# Patient Record
Sex: MALE | Race: WHITE | NOT HISPANIC OR LATINO | Employment: OTHER | URBAN - METROPOLITAN AREA
[De-identification: names, ages, dates, MRNs, and addresses within clinical notes are randomized per-mention and may not be internally consistent; named-entity substitution may affect disease eponyms.]

---

## 2018-01-13 ENCOUNTER — APPOINTMENT (OUTPATIENT)
Dept: GENERAL RADIOLOGY | Facility: HOSPITAL | Age: 63
End: 2018-01-13

## 2018-01-13 ENCOUNTER — APPOINTMENT (OUTPATIENT)
Dept: ULTRASOUND IMAGING | Facility: HOSPITAL | Age: 63
End: 2018-01-13

## 2018-01-13 ENCOUNTER — APPOINTMENT (OUTPATIENT)
Dept: CT IMAGING | Facility: HOSPITAL | Age: 63
End: 2018-01-13

## 2018-01-13 ENCOUNTER — APPOINTMENT (OUTPATIENT)
Dept: MRI IMAGING | Facility: HOSPITAL | Age: 63
End: 2018-01-13

## 2018-01-13 ENCOUNTER — HOSPITAL ENCOUNTER (INPATIENT)
Facility: HOSPITAL | Age: 63
LOS: 2 days | Discharge: HOME | End: 2018-01-15
Attending: EMERGENCY MEDICINE | Admitting: FAMILY MEDICINE

## 2018-01-13 DIAGNOSIS — K85.90 ACUTE PANCREATITIS, UNSPECIFIED COMPLICATION STATUS, UNSPECIFIED PANCREATITIS TYPE: Primary | ICD-10-CM

## 2018-01-13 DIAGNOSIS — K85.00 IDIOPATHIC ACUTE PANCREATITIS WITHOUT INFECTION OR NECROSIS: ICD-10-CM

## 2018-01-13 DIAGNOSIS — Q45.3 PANCREAS DIVISUM: ICD-10-CM

## 2018-01-13 DIAGNOSIS — Z76.89 REFERRED BY PRIMARY CARE PHYSICIAN: ICD-10-CM

## 2018-01-13 DIAGNOSIS — R10.9 ABDOMINAL PAIN, UNSPECIFIED ABDOMINAL LOCATION: ICD-10-CM

## 2018-01-13 DIAGNOSIS — D72.829 LEUKOCYTOSIS, UNSPECIFIED TYPE: ICD-10-CM

## 2018-01-13 LAB
ALBUMIN SERPL-MCNC: 4.6 G/DL (ref 3.2–4.8)
ALBUMIN/GLOB SERPL: 1.4 G/DL (ref 1.5–2.5)
ALP SERPL-CCNC: 62 U/L (ref 25–100)
ALT SERPL W P-5'-P-CCNC: 39 U/L (ref 7–40)
ANION GAP SERPL CALCULATED.3IONS-SCNC: 13 MMOL/L (ref 3–11)
AST SERPL-CCNC: 28 U/L (ref 0–33)
BASOPHILS # BLD AUTO: 0.01 10*3/MM3 (ref 0–0.2)
BASOPHILS NFR BLD AUTO: 0.1 % (ref 0–1)
BILIRUB SERPL-MCNC: 0.5 MG/DL (ref 0.3–1.2)
BILIRUB UR QL STRIP: NEGATIVE
BUN BLD-MCNC: 23 MG/DL (ref 9–23)
BUN/CREAT SERPL: 23 (ref 7–25)
CALCIUM SPEC-SCNC: 9.6 MG/DL (ref 8.7–10.4)
CHLORIDE SERPL-SCNC: 102 MMOL/L (ref 99–109)
CLARITY UR: CLEAR
CO2 SERPL-SCNC: 20 MMOL/L (ref 20–31)
COLOR UR: YELLOW
CREAT BLD-MCNC: 1 MG/DL (ref 0.6–1.3)
DEPRECATED RDW RBC AUTO: 44.4 FL (ref 37–54)
EOSINOPHIL # BLD AUTO: 0.03 10*3/MM3 (ref 0–0.3)
EOSINOPHIL NFR BLD AUTO: 0.2 % (ref 0–3)
ERYTHROCYTE [DISTWIDTH] IN BLOOD BY AUTOMATED COUNT: 14.2 % (ref 11.3–14.5)
GFR SERPL CREATININE-BSD FRML MDRD: 75 ML/MIN/1.73
GLOBULIN UR ELPH-MCNC: 3.2 GM/DL
GLUCOSE BLD-MCNC: 145 MG/DL (ref 70–100)
GLUCOSE UR STRIP-MCNC: NEGATIVE MG/DL
HCT VFR BLD AUTO: 45.1 % (ref 38.9–50.9)
HGB BLD-MCNC: 15.7 G/DL (ref 13.1–17.5)
HGB UR QL STRIP.AUTO: NEGATIVE
HOLD SPECIMEN: NORMAL
HOLD SPECIMEN: NORMAL
IMM GRANULOCYTES # BLD: 0.03 10*3/MM3 (ref 0–0.03)
IMM GRANULOCYTES NFR BLD: 0.2 % (ref 0–0.6)
KETONES UR QL STRIP: ABNORMAL
LEUKOCYTE ESTERASE UR QL STRIP.AUTO: NEGATIVE
LIPASE SERPL-CCNC: 565 U/L (ref 6–51)
LYMPHOCYTES # BLD AUTO: 0.76 10*3/MM3 (ref 0.6–4.8)
LYMPHOCYTES NFR BLD AUTO: 6.3 % (ref 24–44)
MCH RBC QN AUTO: 29.9 PG (ref 27–31)
MCHC RBC AUTO-ENTMCNC: 34.8 G/DL (ref 32–36)
MCV RBC AUTO: 85.9 FL (ref 80–99)
MONOCYTES # BLD AUTO: 0.56 10*3/MM3 (ref 0–1)
MONOCYTES NFR BLD AUTO: 4.6 % (ref 0–12)
NEUTROPHILS # BLD AUTO: 10.67 10*3/MM3 (ref 1.5–8.3)
NEUTROPHILS NFR BLD AUTO: 88.6 % (ref 41–71)
NITRITE UR QL STRIP: NEGATIVE
PH UR STRIP.AUTO: 6 [PH] (ref 5–8)
PLATELET # BLD AUTO: 204 10*3/MM3 (ref 150–450)
PMV BLD AUTO: 12 FL (ref 6–12)
POTASSIUM BLD-SCNC: 3.8 MMOL/L (ref 3.5–5.5)
PROT SERPL-MCNC: 7.8 G/DL (ref 5.7–8.2)
PROT UR QL STRIP: ABNORMAL
RBC # BLD AUTO: 5.25 10*6/MM3 (ref 4.2–5.76)
SODIUM BLD-SCNC: 135 MMOL/L (ref 132–146)
SP GR UR STRIP: 1.03 (ref 1–1.03)
TRIGL SERPL-MCNC: 54 MG/DL (ref 0–150)
TROPONIN I SERPL-MCNC: <0.006 NG/ML
UROBILINOGEN UR QL STRIP: ABNORMAL
WBC NRBC COR # BLD: 12.06 10*3/MM3 (ref 3.5–10.8)
WHOLE BLOOD HOLD SPECIMEN: NORMAL
WHOLE BLOOD HOLD SPECIMEN: NORMAL

## 2018-01-13 PROCEDURE — 25010000002 FENTANYL CITRATE (PF) 100 MCG/2ML SOLUTION: Performed by: EMERGENCY MEDICINE

## 2018-01-13 PROCEDURE — 74181 MRI ABDOMEN W/O CONTRAST: CPT

## 2018-01-13 PROCEDURE — 25010000002 KETOROLAC TROMETHAMINE PER 15 MG: Performed by: EMERGENCY MEDICINE

## 2018-01-13 PROCEDURE — 76705 ECHO EXAM OF ABDOMEN: CPT

## 2018-01-13 PROCEDURE — 81003 URINALYSIS AUTO W/O SCOPE: CPT | Performed by: EMERGENCY MEDICINE

## 2018-01-13 PROCEDURE — 0 IOPAMIDOL 61 % SOLUTION: Performed by: EMERGENCY MEDICINE

## 2018-01-13 PROCEDURE — 93005 ELECTROCARDIOGRAM TRACING: CPT | Performed by: EMERGENCY MEDICINE

## 2018-01-13 PROCEDURE — 99253 IP/OBS CNSLTJ NEW/EST LOW 45: CPT | Performed by: INTERNAL MEDICINE

## 2018-01-13 PROCEDURE — 84478 ASSAY OF TRIGLYCERIDES: CPT | Performed by: INTERNAL MEDICINE

## 2018-01-13 PROCEDURE — 99223 1ST HOSP IP/OBS HIGH 75: CPT | Performed by: FAMILY MEDICINE

## 2018-01-13 PROCEDURE — 85025 COMPLETE CBC W/AUTO DIFF WBC: CPT | Performed by: EMERGENCY MEDICINE

## 2018-01-13 PROCEDURE — 83690 ASSAY OF LIPASE: CPT | Performed by: EMERGENCY MEDICINE

## 2018-01-13 PROCEDURE — 99285 EMERGENCY DEPT VISIT HI MDM: CPT

## 2018-01-13 PROCEDURE — 71045 X-RAY EXAM CHEST 1 VIEW: CPT

## 2018-01-13 PROCEDURE — 84484 ASSAY OF TROPONIN QUANT: CPT | Performed by: EMERGENCY MEDICINE

## 2018-01-13 PROCEDURE — 80053 COMPREHEN METABOLIC PANEL: CPT | Performed by: EMERGENCY MEDICINE

## 2018-01-13 PROCEDURE — 25010000002 ONDANSETRON PER 1 MG: Performed by: EMERGENCY MEDICINE

## 2018-01-13 PROCEDURE — 74177 CT ABD & PELVIS W/CONTRAST: CPT

## 2018-01-13 RX ORDER — CHOLECALCIFEROL (VITAMIN D3) 50 MCG
2000 TABLET ORAL DAILY
COMMUNITY

## 2018-01-13 RX ORDER — ALUMINA, MAGNESIA, AND SIMETHICONE 2400; 2400; 240 MG/30ML; MG/30ML; MG/30ML
15 SUSPENSION ORAL ONCE
Status: COMPLETED | OUTPATIENT
Start: 2018-01-13 | End: 2018-01-13

## 2018-01-13 RX ORDER — NALOXONE HCL 0.4 MG/ML
0.4 VIAL (ML) INJECTION
Status: DISCONTINUED | OUTPATIENT
Start: 2018-01-13 | End: 2018-01-15 | Stop reason: HOSPADM

## 2018-01-13 RX ORDER — KETOROLAC TROMETHAMINE 15 MG/ML
15 INJECTION, SOLUTION INTRAMUSCULAR; INTRAVENOUS ONCE
Status: COMPLETED | OUTPATIENT
Start: 2018-01-13 | End: 2018-01-13

## 2018-01-13 RX ORDER — RAMIPRIL 5 MG/1
10 CAPSULE ORAL DAILY
Status: DISCONTINUED | OUTPATIENT
Start: 2018-01-13 | End: 2018-01-15 | Stop reason: HOSPADM

## 2018-01-13 RX ORDER — HYDROMORPHONE HYDROCHLORIDE 1 MG/ML
0.5 INJECTION, SOLUTION INTRAMUSCULAR; INTRAVENOUS; SUBCUTANEOUS
Status: DISCONTINUED | OUTPATIENT
Start: 2018-01-13 | End: 2018-01-15 | Stop reason: HOSPADM

## 2018-01-13 RX ORDER — DOCUSATE SODIUM 100 MG/1
100 CAPSULE, LIQUID FILLED ORAL 2 TIMES DAILY
COMMUNITY
End: 2018-01-15 | Stop reason: HOSPADM

## 2018-01-13 RX ORDER — PRAVASTATIN SODIUM 40 MG
40 TABLET ORAL DAILY
COMMUNITY

## 2018-01-13 RX ORDER — ONDANSETRON 2 MG/ML
4 INJECTION INTRAMUSCULAR; INTRAVENOUS EVERY 6 HOURS PRN
Status: DISCONTINUED | OUTPATIENT
Start: 2018-01-13 | End: 2018-01-15 | Stop reason: HOSPADM

## 2018-01-13 RX ORDER — ASPIRIN 81 MG/1
81 TABLET ORAL DAILY
COMMUNITY

## 2018-01-13 RX ORDER — FENTANYL CITRATE 50 UG/ML
50 INJECTION, SOLUTION INTRAMUSCULAR; INTRAVENOUS ONCE
Status: COMPLETED | OUTPATIENT
Start: 2018-01-13 | End: 2018-01-13

## 2018-01-13 RX ORDER — SODIUM CHLORIDE 0.9 % (FLUSH) 0.9 %
1-10 SYRINGE (ML) INJECTION AS NEEDED
Status: DISCONTINUED | OUTPATIENT
Start: 2018-01-13 | End: 2018-01-15 | Stop reason: HOSPADM

## 2018-01-13 RX ORDER — ONDANSETRON 2 MG/ML
4 INJECTION INTRAMUSCULAR; INTRAVENOUS ONCE
Status: COMPLETED | OUTPATIENT
Start: 2018-01-13 | End: 2018-01-13

## 2018-01-13 RX ORDER — SODIUM CHLORIDE 0.9 % (FLUSH) 0.9 %
10 SYRINGE (ML) INJECTION AS NEEDED
Status: DISCONTINUED | OUTPATIENT
Start: 2018-01-13 | End: 2018-01-15 | Stop reason: HOSPADM

## 2018-01-13 RX ORDER — HYDROCODONE BITARTRATE AND ACETAMINOPHEN 5; 325 MG/1; MG/1
1 TABLET ORAL EVERY 4 HOURS PRN
Status: DISCONTINUED | OUTPATIENT
Start: 2018-01-13 | End: 2018-01-15 | Stop reason: HOSPADM

## 2018-01-13 RX ORDER — HYDROCHLOROTHIAZIDE 12.5 MG/1
12.5 TABLET ORAL DAILY
Status: DISCONTINUED | OUTPATIENT
Start: 2018-01-13 | End: 2018-01-15 | Stop reason: HOSPADM

## 2018-01-13 RX ORDER — ATORVASTATIN CALCIUM 10 MG/1
10 TABLET, FILM COATED ORAL NIGHTLY
Status: DISCONTINUED | OUTPATIENT
Start: 2018-01-13 | End: 2018-01-15 | Stop reason: HOSPADM

## 2018-01-13 RX ORDER — RAMIPRIL 10 MG/1
10 CAPSULE ORAL DAILY
COMMUNITY

## 2018-01-13 RX ORDER — LORAZEPAM 1 MG/1
1 TABLET ORAL EVERY 6 HOURS PRN
Status: DISCONTINUED | OUTPATIENT
Start: 2018-01-13 | End: 2018-01-15 | Stop reason: HOSPADM

## 2018-01-13 RX ORDER — HYDROCHLOROTHIAZIDE 12.5 MG/1
12.5 TABLET ORAL DAILY
COMMUNITY

## 2018-01-13 RX ORDER — SODIUM CHLORIDE 9 MG/ML
100 INJECTION, SOLUTION INTRAVENOUS CONTINUOUS
Status: DISCONTINUED | OUTPATIENT
Start: 2018-01-13 | End: 2018-01-15 | Stop reason: HOSPADM

## 2018-01-13 RX ADMIN — RAMIPRIL 10 MG: 5 CAPSULE ORAL at 15:47

## 2018-01-13 RX ADMIN — SODIUM CHLORIDE 1000 ML: 9 INJECTION, SOLUTION INTRAVENOUS at 08:00

## 2018-01-13 RX ADMIN — IOPAMIDOL 85 ML: 612 INJECTION, SOLUTION INTRAVENOUS at 10:36

## 2018-01-13 RX ADMIN — SODIUM CHLORIDE 100 ML/HR: 9 INJECTION, SOLUTION INTRAVENOUS at 14:57

## 2018-01-13 RX ADMIN — ONDANSETRON 4 MG: 2 INJECTION INTRAMUSCULAR; INTRAVENOUS at 08:02

## 2018-01-13 RX ADMIN — HYDROCODONE BITARTRATE AND ACETAMINOPHEN 1 TABLET: 5; 325 TABLET ORAL at 18:41

## 2018-01-13 RX ADMIN — HYDROCODONE BITARTRATE AND ACETAMINOPHEN 1 TABLET: 5; 325 TABLET ORAL at 22:39

## 2018-01-13 RX ADMIN — HYDROCHLOROTHIAZIDE 12.5 MG: 12.5 TABLET ORAL at 14:37

## 2018-01-13 RX ADMIN — FENTANYL CITRATE 50 MCG: 50 INJECTION INTRAMUSCULAR; INTRAVENOUS at 08:03

## 2018-01-13 RX ADMIN — HYDROCODONE BITARTRATE AND ACETAMINOPHEN 1 TABLET: 5; 325 TABLET ORAL at 14:41

## 2018-01-13 RX ADMIN — FENTANYL CITRATE 50 MCG: 50 INJECTION INTRAMUSCULAR; INTRAVENOUS at 12:53

## 2018-01-13 RX ADMIN — ATORVASTATIN CALCIUM 10 MG: 10 TABLET, FILM COATED ORAL at 20:50

## 2018-01-13 RX ADMIN — ALUMINUM HYDROXIDE, MAGNESIUM HYDROXIDE, AND DIMETHICONE 15 ML: 400; 400; 40 SUSPENSION ORAL at 07:59

## 2018-01-13 RX ADMIN — KETOROLAC TROMETHAMINE 15 MG: 15 INJECTION, SOLUTION INTRAMUSCULAR; INTRAVENOUS at 09:24

## 2018-01-13 RX ADMIN — LIDOCAINE HYDROCHLORIDE 15 ML: 20 SOLUTION ORAL; TOPICAL at 07:58

## 2018-01-13 NOTE — H&P
[facility] Medicine Services  HISTORY AND PHYSICAL    Primary Care Physician: Yuri Saul MD    Subjective     Chief Complaint:  Abdominal pain    History of Present Illness: This is a pleasant 62 year old  male accompanied by his wife Caro of 39 years to [facility] ED secondary to abdominal pain.  He describes less than 24 hours of constant, mid abdominal pain not resolving with home care.  He rates the non-radiating pain as severe and reports its as a 7 on a 1 to 10 pain scale.  He describes a decreased appetite.  He can not identify any modifying activities.  He reports associated nausea but no emesis, hematemesis, melena or hematochezia.  His last bowel movement was this morning described as normal.  He denies diarrhea, chalky stools or prior history of similar pain.  He describes occasional indigestion that resolves with over the counter antacid.  He denies dysphagia, odynophagia or previous upper endoscopies.  He denies fever, chills or jaundice.  He drinks 2-3 beers a week.  He denies excessive alcohol consumption.  In the ED his lipase was 565 and a CT identified pancreatic inflammation.    Review of Systems   Constitutional: Negative.  Negative for chills and fever.   HENT: Negative.  Negative for nosebleeds and trouble swallowing.    Eyes: Negative.  Negative for visual disturbance.   Respiratory: Negative.    Cardiovascular: Negative.  Negative for chest pain, palpitations and leg swelling.   Gastrointestinal: Positive for abdominal distention, abdominal pain and nausea. Negative for anal bleeding, blood in stool, constipation, diarrhea and vomiting.   Endocrine: Negative.  Negative for polydipsia, polyphagia and polyuria.   Genitourinary: Negative.  Negative for difficulty urinating.   Musculoskeletal: Negative.  Negative for myalgias.   Skin: Negative.  Negative for pallor and rash.   Allergic/Immunologic: Negative.    Neurological: Negative.  Negative for  "dizziness.   Hematological: Negative.  Does not bruise/bleed easily.   Psychiatric/Behavioral: Negative.    All other systems reviewed and are negative.     Past Medical History:   Diagnosis Date   • Hyperlipidemia    • Hypertension    • Left ACL tear        Past Surgical History:   Procedure Laterality Date   • COLONOSCOPY  2014   • VASECTOMY  1987       Family History   Problem Relation Age of Onset   • Alzheimer's disease Mother    • Heart disease Father      Social History     Social History   • Marital status:      Spouse name: Caro   • Number of children: 2   • Years of education: College     Social History Main Topics   • Smoking status: Never Smoker   • Smokeless tobacco: Never Used   • Alcohol use Yes   • Drug use: No   • Sexual activity: Yes     Partners: Female     Medications:  Medication Sig   • aspirin 81 MG EC tablet Take 81 mg by mouth Daily.   • Cholecalciferol (VITAMIN D) 2000 units tablet Take 2,000 Units by mouth Daily.   • docusate sodium (COLACE) 100 MG capsule Take 100 mg by mouth 2 (Two) Times a Day.   • hydrochlorothiazide (HYDRODIURIL) 12.5 MG tablet Take 12.5 mg by mouth Daily.   • pravastatin (PRAVACHOL) 40 MG tablet Take 40 mg by mouth Daily.   • ramipril (ALTACE) 10 MG capsule Take 10 mg by mouth Daily.       Allergies:  Allergies   Allergen Reactions   • Penicillins Hives       Objective     Vital Signs: /93 (BP Location: Left arm, Patient Position: Lying)  Pulse 51  Temp 98 °F (36.7 °C) (Oral)   Resp 20  Ht 170.2 cm (67\")  Wt 77.2 kg (170 lb 4.8 oz)  SpO2 98%  BMI 26.67 kg/m2  Body mass index is 26.67 kg/(m^2).    Physical Exam   Constitutional: He is oriented to person, place, and time. He appears well-developed and well-nourished. He is cooperative.   HENT:   Head: Normocephalic and atraumatic.   Right Ear: Hearing and external ear normal.   Left Ear: Hearing and external ear normal.   Nose: Nose normal.   Mouth/Throat: Uvula is midline, oropharynx is clear and " moist and mucous membranes are normal.   Eyes: Conjunctivae and EOM are normal. Pupils are equal, round, and reactive to light. No scleral icterus.   Neck: Trachea normal and normal range of motion. Neck supple. No JVD present. Carotid bruit is not present. No thyromegaly present.   Cardiovascular: Normal rate, regular rhythm, normal heart sounds and intact distal pulses.    Pulmonary/Chest: Effort normal and breath sounds normal.   Abdominal: Soft. Bowel sounds are normal. There is no hepatosplenomegaly. There is tenderness in the right upper quadrant and periumbilical area. There is no rebound and no guarding.   Musculoskeletal: Normal range of motion.   Lymphadenopathy:     He has no cervical adenopathy.   Neurological: He is alert and oriented to person, place, and time. He has normal strength and normal reflexes. No sensory deficit.   Skin: Skin is warm and dry.   Psychiatric: He has a normal mood and affect. His speech is normal and behavior is normal. Judgment and thought content normal. Cognition and memory are normal.   Nursing note and vitals reviewed.    Results Reviewed:     Results from last 7 days  Lab Units 01/13/18  0738   WBC 10*3/mm3 12.06*   HEMOGLOBIN g/dL 15.7   PLATELETS 10*3/mm3 204       Results from last 7 days  Lab Units 01/13/18  0738   SODIUM mmol/L 135   POTASSIUM mmol/L 3.8   CO2 mmol/L 20.0   CREATININE mg/dL 1.00   GLUCOSE mg/dL 145*   CALCIUM mg/dL 9.6     I have personally reviewed and interpreted available lab data dated 01/13/18.  I have personally reviewed CXR and CT abdomen imaging reports available and dated 01/13/18.  I have personally reviewed ECG report dated 01/13/18.   I have personally discussed the case with the ED physician.  I have personally reviewed and summarized old records.    Assessment / Plan     Assessment/Problem List:   Active Problems:    Acute pancreatitis    Hyperlipidemia    Hypertension    Plan:  We will admit as inpatient on telemetry.  I believe this  patient meets INPATIENT status due to the need for care which can only be reasonably provided in an hospital setting such as aggressive/expedited ancillary services and/or consultation services and the necessity for IV medications, close physician monitoring and/or the possible need for procedures.  In such, I feel patient’s risk for adverse outcomes and need for care warrant INPATIENT evaluation and predict the patient’s care encounter to likely last beyond 2 midnights.  We will consult gastroenterology.  We will continue with IV fluids and maintain NPO.  An ultrasound has been ordered of his gallbladder.  We will continue with parentally administered controlled substances to assist with his pain.  We will add anti-emetics as well.  We will follow his lipase level.  Morning labs have been ordered including a lipid analysis (triglycerides).  We will continue to monitor his blood pressure and continue with his blood pressure and statin medications.  The plan has been discussed with the patient and his wife who is at the bedside.      DVT prophylaxis: bhavani portillo  Code Status: Full  Admission Status: Patient will be admitted to [facility].     Nguyễn Hager MD 01/13/18 5:19 PM

## 2018-01-13 NOTE — PLAN OF CARE
Problem: Pain, Acute (Adult)  Goal: Identify Related Risk Factors and Signs and Symptoms  Outcome: Ongoing (interventions implemented as appropriate)   01/13/18 1759   Pain, Acute   Related Risk Factors (Acute Pain) fear   Signs and Symptoms (Acute Pain) verbalization of pain descriptors     Goal: Acceptable Pain Control/Comfort Level  Outcome: Ongoing (interventions implemented as appropriate)   01/13/18 1759   Pain, Acute (Adult)   Acceptable Pain Control/Comfort Level making progress toward outcome, pain acceptable range less then 4 on a pain scale of 0 to 10.   Pt states adequate control with pain pill.

## 2018-01-13 NOTE — ED PROVIDER NOTES
Subjective   Patient is a 62 y.o. male presenting with abdominal pain.   Abdominal Pain   Pain location:  Periumbilical  Pain quality: aching, bloating and burning    Pain radiates to:  Does not radiate  Pain severity:  Moderate  Onset quality:  Gradual  Duration:  1 day  Timing:  Constant  Progression:  Waxing and waning  Chronicity:  New  Context: eating    Relieved by:  Nothing  Worsened by:  Eating  Ineffective treatments:  Cold, heat, liquids, eating and antacids  Associated symptoms: nausea and vomiting    Associated symptoms: no chest pain, no constipation and no shortness of breath        Review of Systems   Respiratory: Negative for shortness of breath.    Cardiovascular: Negative for chest pain.   Gastrointestinal: Positive for abdominal pain, nausea and vomiting. Negative for constipation.   All other systems reviewed and are negative.      Past Medical History:   Diagnosis Date   • Hyperlipidemia    • Hypertension        Allergies   Allergen Reactions   • Penicillins Hives       History reviewed. No pertinent surgical history.    History reviewed. No pertinent family history.    Social History     Social History   • Marital status:      Spouse name: N/A   • Number of children: N/A   • Years of education: N/A     Social History Main Topics   • Smoking status: Never Smoker   • Smokeless tobacco: None   • Alcohol use Yes      Comment: socially   • Drug use: No   • Sexual activity: Defer     Other Topics Concern   • None     Social History Narrative   • None           Objective   Physical Exam   Constitutional: He is oriented to person, place, and time. He appears well-developed and well-nourished.   HENT:   Head: Normocephalic and atraumatic.   Right Ear: External ear normal.   Left Ear: External ear normal.   Nose: Nose normal.   Mouth/Throat: Oropharynx is clear and moist.   Eyes: Conjunctivae and EOM are normal. Pupils are equal, round, and reactive to light.   Neck: Normal range of motion. Neck  supple.   Cardiovascular: Normal rate, regular rhythm, normal heart sounds and intact distal pulses.    Pulmonary/Chest: Effort normal and breath sounds normal.   Abdominal: Soft. Bowel sounds are normal. There is tenderness.   Musculoskeletal: Normal range of motion.   Neurological: He is alert and oriented to person, place, and time.   Skin: Skin is warm and dry.   Psychiatric: He has a normal mood and affect. His behavior is normal. Judgment normal.       Procedures         ED Course  ED Course   Value Comment By Time   Troponin I: <0.006 (Reviewed) Albin Barnhart MD 01/13 0842   Lipase: (!) 565 (Reviewed) Albin Barnhart MD 01/13 1113    I spoke with Dr. Hager and he will admit TAMMY Mccollum 01/13 1216                  MDM    Final diagnoses:   Acute pancreatitis, unspecified complication status, unspecified pancreatitis type   Abdominal pain, unspecified abdominal location   Leukocytosis, unspecified type   Referred by primary care physician            TAMMY Mccollum  01/13/18 1218

## 2018-01-13 NOTE — CONSULTS
[facility] Consult    Referring Provider: Nguyễn Hager MD    PCP: Yuri Saul MD    Reason for Consultation: Acute pancreatitis    Chief complaint: Abdominal pain    History of present illness:    Nimesh Muñoz is a 62 y.o. male who is admitted with 1-day history of acute non-radiating abdominal pain in mid abdomen. Pain constant burning sensation. There is associated nausea, non-bloody emesis, and bloating. Not improved with antacids. Evaluation in ER revealed lipase 565. CT with contrast shows inflammation at tail of pancreas.    Allergies:  Penicillins    Scheduled Meds:    atorvastatin 10 mg Oral Nightly   hydrochlorothiazide 12.5 mg Oral Daily   ramipril 10 mg Oral Daily        Infusions:    sodium chloride 100 mL/hr       PRN Meds:  HYDROcodone-acetaminophen  •  HYDROmorphone **AND** naloxone  •  LORazepam  •  ondansetron  •  sodium chloride  •  sodium chloride    Home Meds:  Prescriptions Prior to Admission   Medication Sig Dispense Refill Last Dose   • hydrochlorothiazide (HYDRODIURIL) 12.5 MG tablet Take 12.5 mg by mouth Daily.      • pravastatin (PRAVACHOL) 40 MG tablet Take 40 mg by mouth Daily.      • ramipril (ALTACE) 10 MG capsule Take 10 mg by mouth Daily.          ROS: Review of Systems   Constitutional: Positive for appetite change. Negative for activity change, chills, fever and unexpected weight change.   HENT: Negative for congestion, mouth sores and trouble swallowing.    Eyes: Negative.    Respiratory: Negative for cough, choking, chest tightness, shortness of breath and wheezing.    Cardiovascular: Negative for chest pain, palpitations and leg swelling.        Patient has normal heart rate in low 50's. He exercises regularly.   Gastrointestinal: Positive for abdominal distention, abdominal pain, nausea and vomiting.   Endocrine: Negative.    Genitourinary: Negative.    Musculoskeletal: Negative for arthralgias, back pain and joint swelling.        Had left shoulder pain  "last week.   Skin: Negative for color change, pallor and rash.   Allergic/Immunologic: Negative.    Neurological: Negative for dizziness, tremors, seizures, syncope, weakness and light-headedness.   Hematological: Negative for adenopathy. Does not bruise/bleed easily.   Psychiatric/Behavioral: Negative for agitation, behavioral problems and dysphoric mood. The patient is not nervous/anxious.        PAST MED HX:  Past Medical History:   Diagnosis Date   • Hyperlipidemia    • Hypertension    Adenomatous colon polyps.    PAST SURG HX:  Colonoscopy 3/2016 with Dr. Vinod WEN HX:  History reviewed. No pertinent family history.    SOC HX:  Social History     Social History   • Marital status:      Spouse name: Caro   • Number of children: N/A   • Years of education: N/A     Occupational History   • Not on file.     Social History Main Topics   • Smoking status: Never Smoker   • Smokeless tobacco: Never Used   • Alcohol use Yes   • Drug use: No   • Sexual activity: Yes     Partners: Female     Other Topics Concern   • Not on file     Social History Narrative   • Exercises regularly.       PHYSICAL EXAM  /88  Pulse 50  Temp 98.2 °F (36.8 °C)  Resp 16  Ht 170.2 cm (67\")  Wt 74.8 kg (165 lb)  SpO2 93%  BMI 25.84 kg/m2  Wt Readings from Last 3 Encounters:   01/13/18 74.8 kg (165 lb)   ,body mass index is 25.84 kg/(m^2).  Physical Exam   Constitutional: He is oriented to person, place, and time. He appears well-developed and well-nourished. No distress.   HENT:   Head: Normocephalic.   Eyes: Conjunctivae are normal. No scleral icterus.   Neck: Normal range of motion.   Cardiovascular: Normal rate.    Mild bradycardia.   Pulmonary/Chest: Effort normal and breath sounds normal. No respiratory distress. He has no wheezes.   Abdominal: Soft. Bowel sounds are normal. He exhibits no distension and no mass. There is no tenderness.   Genitourinary:   Genitourinary Comments: Deferred   Musculoskeletal: Normal range " of motion. He exhibits no edema.   Neurological: He is alert and oriented to person, place, and time.   Skin: Skin is warm and dry. No rash noted.   Psychiatric: He has a normal mood and affect. His behavior is normal. Judgment and thought content normal.   Nursing note and vitals reviewed.        Results Review:   I reviewed the patient's new clinical results.    Lab Results   Component Value Date    WBC 12.06 (H) 01/13/2018    HGB 15.7 01/13/2018    HCT 45.1 01/13/2018    MCV 85.9 01/13/2018     01/13/2018       No results found for: INR    Lab Results   Component Value Date    GLUCOSE 145 (H) 01/13/2018    BUN 23 01/13/2018    CREATININE 1.00 01/13/2018    EGFRIFNONA 75 01/13/2018    BCR 23.0 01/13/2018    CO2 20.0 01/13/2018    CALCIUM 9.6 01/13/2018    ALBUMIN 4.60 01/13/2018    AST 28 01/13/2018    ALT 39 01/13/2018         ASSESSMENTS/PLANS    Mild acute interstitial pancreatitis. CT films reviewed and agree with mild inflammation at tail.  Nausea and vomiting, improved.  Epigastric pain, controlled with opiates.  Mild bradycardia due to conditioning (regular exercise).    Discussion: Patient rarely drinks alcohol, and not recently. Has no history of recent abdominal trauma. Takes no OTC medications or herbal remedies. He states that his triglycerides have been normal in the past. Gallstone pancreatitis is most likely. Side effect of HCTZ or ramipril is less likely.     >> Await GB US  >> If GB US is negative, obtain MRCP.  >> Triglycerides added to lab sample.    I discussed the patients findings and my recommendations with patient and wife.    Mark I. Brunner, MD  01/13/18  2:34 PM

## 2018-01-14 PROBLEM — Q45.3 PANCREAS DIVISUM: Status: ACTIVE | Noted: 2018-01-14

## 2018-01-14 LAB
ALBUMIN SERPL-MCNC: 3.9 G/DL (ref 3.2–4.8)
ALBUMIN/GLOB SERPL: 1.6 G/DL (ref 1.5–2.5)
ALP SERPL-CCNC: 57 U/L (ref 25–100)
ALT SERPL W P-5'-P-CCNC: 29 U/L (ref 7–40)
ANION GAP SERPL CALCULATED.3IONS-SCNC: 9 MMOL/L (ref 3–11)
ARTICHOKE IGE QN: 60 MG/DL (ref 0–130)
AST SERPL-CCNC: 20 U/L (ref 0–33)
BASOPHILS # BLD AUTO: 0.02 10*3/MM3 (ref 0–0.2)
BASOPHILS NFR BLD AUTO: 0.2 % (ref 0–1)
BILIRUB SERPL-MCNC: 0.9 MG/DL (ref 0.3–1.2)
BUN BLD-MCNC: 16 MG/DL (ref 9–23)
BUN/CREAT SERPL: 20 (ref 7–25)
CALCIUM SPEC-SCNC: 8.6 MG/DL (ref 8.7–10.4)
CHLORIDE SERPL-SCNC: 103 MMOL/L (ref 99–109)
CHOLEST SERPL-MCNC: 125 MG/DL (ref 0–200)
CO2 SERPL-SCNC: 23 MMOL/L (ref 20–31)
CREAT BLD-MCNC: 0.8 MG/DL (ref 0.6–1.3)
DEPRECATED RDW RBC AUTO: 45.1 FL (ref 37–54)
EOSINOPHIL # BLD AUTO: 0.11 10*3/MM3 (ref 0–0.3)
EOSINOPHIL NFR BLD AUTO: 1.1 % (ref 0–3)
ERYTHROCYTE [DISTWIDTH] IN BLOOD BY AUTOMATED COUNT: 14.1 % (ref 11.3–14.5)
GFR SERPL CREATININE-BSD FRML MDRD: 98 ML/MIN/1.73
GLOBULIN UR ELPH-MCNC: 2.5 GM/DL
GLUCOSE BLD-MCNC: 90 MG/DL (ref 70–100)
HBA1C MFR BLD: 5.7 % (ref 4.8–5.6)
HCT VFR BLD AUTO: 41.5 % (ref 38.9–50.9)
HDLC SERPL-MCNC: 56 MG/DL (ref 40–60)
HGB BLD-MCNC: 14 G/DL (ref 13.1–17.5)
IMM GRANULOCYTES # BLD: 0.03 10*3/MM3 (ref 0–0.03)
IMM GRANULOCYTES NFR BLD: 0.3 % (ref 0–0.6)
LIPASE SERPL-CCNC: 349 U/L (ref 6–51)
LYMPHOCYTES # BLD AUTO: 1.2 10*3/MM3 (ref 0.6–4.8)
LYMPHOCYTES NFR BLD AUTO: 11.9 % (ref 24–44)
MCH RBC QN AUTO: 29.4 PG (ref 27–31)
MCHC RBC AUTO-ENTMCNC: 33.7 G/DL (ref 32–36)
MCV RBC AUTO: 87.2 FL (ref 80–99)
MONOCYTES # BLD AUTO: 0.91 10*3/MM3 (ref 0–1)
MONOCYTES NFR BLD AUTO: 9 % (ref 0–12)
NEUTROPHILS # BLD AUTO: 7.83 10*3/MM3 (ref 1.5–8.3)
NEUTROPHILS NFR BLD AUTO: 77.5 % (ref 41–71)
PLATELET # BLD AUTO: 171 10*3/MM3 (ref 150–450)
PMV BLD AUTO: 11.7 FL (ref 6–12)
POTASSIUM BLD-SCNC: 4.1 MMOL/L (ref 3.5–5.5)
PROT SERPL-MCNC: 6.4 G/DL (ref 5.7–8.2)
RBC # BLD AUTO: 4.76 10*6/MM3 (ref 4.2–5.76)
SODIUM BLD-SCNC: 135 MMOL/L (ref 132–146)
TRIGL SERPL-MCNC: 59 MG/DL (ref 0–150)
TSH SERPL DL<=0.05 MIU/L-ACNC: 2.77 MIU/ML (ref 0.35–5.35)
WBC NRBC COR # BLD: 10.1 10*3/MM3 (ref 3.5–10.8)

## 2018-01-14 PROCEDURE — 99232 SBSQ HOSP IP/OBS MODERATE 35: CPT | Performed by: INTERNAL MEDICINE

## 2018-01-14 PROCEDURE — 99232 SBSQ HOSP IP/OBS MODERATE 35: CPT | Performed by: FAMILY MEDICINE

## 2018-01-14 PROCEDURE — 80053 COMPREHEN METABOLIC PANEL: CPT | Performed by: FAMILY MEDICINE

## 2018-01-14 PROCEDURE — 83036 HEMOGLOBIN GLYCOSYLATED A1C: CPT | Performed by: FAMILY MEDICINE

## 2018-01-14 PROCEDURE — 85025 COMPLETE CBC W/AUTO DIFF WBC: CPT | Performed by: FAMILY MEDICINE

## 2018-01-14 PROCEDURE — 80061 LIPID PANEL: CPT | Performed by: FAMILY MEDICINE

## 2018-01-14 PROCEDURE — 84443 ASSAY THYROID STIM HORMONE: CPT | Performed by: FAMILY MEDICINE

## 2018-01-14 PROCEDURE — 83690 ASSAY OF LIPASE: CPT | Performed by: FAMILY MEDICINE

## 2018-01-14 RX ORDER — SENNA AND DOCUSATE SODIUM 50; 8.6 MG/1; MG/1
2 TABLET, FILM COATED ORAL ONCE
Status: COMPLETED | OUTPATIENT
Start: 2018-01-14 | End: 2018-01-14

## 2018-01-14 RX ADMIN — HYDROCHLOROTHIAZIDE 12.5 MG: 12.5 TABLET ORAL at 08:14

## 2018-01-14 RX ADMIN — RAMIPRIL 10 MG: 5 CAPSULE ORAL at 08:14

## 2018-01-14 RX ADMIN — HYDROCODONE BITARTRATE AND ACETAMINOPHEN 1 TABLET: 5; 325 TABLET ORAL at 03:46

## 2018-01-14 RX ADMIN — HYDROCODONE BITARTRATE AND ACETAMINOPHEN 1 TABLET: 5; 325 TABLET ORAL at 08:13

## 2018-01-14 RX ADMIN — Medication 2 TABLET: at 15:53

## 2018-01-14 RX ADMIN — HYDROCODONE BITARTRATE AND ACETAMINOPHEN 1 TABLET: 5; 325 TABLET ORAL at 16:48

## 2018-01-14 RX ADMIN — HYDROCODONE BITARTRATE AND ACETAMINOPHEN 1 TABLET: 5; 325 TABLET ORAL at 21:54

## 2018-01-14 RX ADMIN — HYDROCODONE BITARTRATE AND ACETAMINOPHEN 1 TABLET: 5; 325 TABLET ORAL at 12:05

## 2018-01-14 RX ADMIN — ATORVASTATIN CALCIUM 10 MG: 10 TABLET, FILM COATED ORAL at 21:54

## 2018-01-14 NOTE — PROGRESS NOTES
[facility] Medicine Services  PROGRESS NOTE    Patient Name: Nimesh Muñoz  : 1955  MRN: [id number]    Date of Admission: 2018  Length of Stay: 1  Primary Care Physician: Yuri Saul MD    Subjective   Subjective     CC:  pancreatitis    HPI:  Pain improved but still has waves of pain as pain meds wear off.  Does have appetitie today.  No emesis since admission.     Review of Systems  Otherwise ROS is negative except as mentioned in the HPI.    Objective   Objective     Vital Signs:   Temp:  [97.8 °F (36.6 °C)-98.4 °F (36.9 °C)] 98.3 °F (36.8 °C)  Heart Rate:  [50-55] 52  Resp:  [16-20] 18  BP: (143-201)/(80-98) 155/84        Physical Exam:  Constitutional: No acute distress, awake, alert, nontoxic, normal body habitus  Respiratory: Clear to auscultation bilaterally, good effort, nonlabored respirations   Cardiovascular: RRR  Gastrointestinal: Positive bowel sounds, soft, mild epigastric TTP  Musculoskeletal: No peripheral edema, normal muscle tone for age, no clubbing or cyanosis to extremities  Psychiatric: Appropriate affect, good insight and judgement, cooperative  Skin: No rashes, no jaundice, no petechiae, no mottling    Results Reviewed:  I have personally reviewed current lab, radiology, and data and agree.      Results from last 7 days  Lab Units 18  0840 18  0738   WBC 10*3/mm3 10.10 12.06*   HEMOGLOBIN g/dL 14.0 15.7   HEMATOCRIT % 41.5 45.1   PLATELETS 10*3/mm3 171 204       Results from last 7 days  Lab Units 18  0840 18  0738   SODIUM mmol/L 135 135   POTASSIUM mmol/L 4.1 3.8   CHLORIDE mmol/L 103 102   CO2 mmol/L 23.0 20.0   BUN mg/dL 16 23   CREATININE mg/dL 0.80 1.00   GLUCOSE mg/dL 90 145*   CALCIUM mg/dL 8.6* 9.6   ALT (SGPT) U/L 29 39   AST (SGOT) U/L 20 28   TROPONIN I ng/mL  --  <0.006     No results found for: BNP  No results found for: PHART    Microbiology Results Abnormal     None          Imaging Results (last 24  "hours)     Procedure Component Value Units Date/Time    CT Abdomen Pelvis With Contrast [[id number]] Collected:  01/13/18 1334     Updated:  01/13/18 3893    Narrative:       EXAMINATION: CT ABDOMEN PELVIS W/CONTRAST - 01/13/2018     INDICATION: Abdominal pain, elevated Lipase.       TECHNIQUE: CT scan of the abdomen and pelvis was performed following  intravenous contrast.     The radiation dose reduction device was turned on for each scan per the  ALARA (As Low as Reasonably Achievable) protocol.     COMPARISON: None.     FINDINGS: The most superior images demonstrate no basilar pulmonary  inflammatory process or pleural effusion. The liver and spleen are  normal. There is no pancreatic mass but there is inflammation adjacent  to the tail of the pancreas and extending into the left paracolic  \"gutter\". There is no pseudocyst. There is no adrenal mass. There is no  renal mass, stone or obstruction. There is no ascites, aneurysm or  retroperitoneal lymphadenopathy. The appendix is normal. There are  sigmoid diverticula without features of diverticulitis. There is diffuse  enlargement of the prostate gland. There is no inguinal lymphadenopathy.       Impression:       1. There is inflammatory changes near the tail of the pancreas extending  into the left paracolic \"gutter\". There is no pancreatic solid mass or  pseudocyst.  2. There are nonacute findings including sigmoid diverticulosis and  diffuse prostatic enlargement.     DICTATED:     01/13/2018  EDITED    :     01/13/2018      This report was finalized on 1/13/2018 2:52 PM by Dr. Rachid Muñoz MD.       XR Chest 1 View [[id number]] Collected:  01/13/18 1101     Updated:  01/13/18 6371    Narrative:          EXAMINATION: XR CHEST 1 VW - 01/13/2018     INDICATION: Upper abdominal pain.      COMPARISON: None.     FINDINGS: There is a normal cardiac silhouette. There is no pulmonary  inflammatory process, mass or effusion.           Impression:       No active " disease.     DICTATED:     01/13/2018  EDITED    :     01/13/2018      This report was finalized on 1/13/2018 2:52 PM by Dr. Rachid Muñoz MD.       US Gallbladder [[id number]] Collected:  01/13/18 1643     Updated:  01/13/18 1657    Narrative:       EXAMINATION: US GALLBLADDER - 01/13/2018     INDICATION:  K85.90-Acute pancreatitis without necrosis or infection,  unspecified; R10.9-Unspecified abdominal pain; D72.829-Elevated white  blood cell count, unspecified; Z76.89-Persons encountering health  services in other specified circumstances.     TECHNIQUE: Ultrasound of the right upper quadrant was performed the  longitudinal and transverse planes.     COMPARISON: None.     FINDINGS: The pancreas is largely obscured by bowel gas. The liver  reveals no focal abnormality or biliary ductal dilatation. There is  viscus echogenic bile but no evidence of екатерина cholelithiasis. There is  no edema of the gallbladder wall. The common bile duct is normal with an  AP dimension of 6 mm. The right kidney is normal with a sagittal  dimension of 10.5 cm.       Impression:       Negative ultrasound of the gallbladder.     DICTATED:     01/13/2018  EDITED    :     01/13/2018          This report was finalized on 1/13/2018 4:55 PM by Dr. Rachid Muñoz MD.       MRI abdomen wo contrast mrcp [[id number]] Collected:  01/14/18 0853     Updated:  01/14/18 1204    Narrative:       EXAMINATION: MRI ABDOMEN WO CONTRAST MRCP-      INDICATION: Idiopathic acute pancreatitis; K85.90-Acute pancreatitis  without necrosis or infection, unspecified; R10.9-Unspecified abdominal  pain; D72.829-Elevated white blood cell count, unspecified;  Z76.89-Persons encountering health services in other specified  circumstances mid abdominal pain     TECHNIQUE: Routine multiple imaging is obtained of the abdomen without  the ministration gadolinium contrast. MRCP imaging was also obtained of  the abdomen.        COMPARISON: CT scan dated 01/13/2018     FINDINGS: There  is fluid seen surrounding the body and tail the pancreas  with extension of the fluid predominantly into the left paracolic gutter  and left upper quadrant. There is minimal fluid identified surrounding  the inferior aspect of the liver. The gallbladder is distended. No  definite filling defect to suggest stones. The liver is homogeneous in  appearance as well as the spleen. There is a renal cortical cysts seen  at the inferior aspect of the left kidney. There is no obstruction of  the kidneys. Both adrenal glands are within normal limits. No evidence  of dilatation identified of the biliary ductal system. No interpedicular  extrahepatic biliary ductal dilatation. No filling defects seen in the  to suggest evidence of a stone. The pancreatic duct is normal in  caliber. The visualized portions of the gastrointestinal tract reveal  stool within the colon. No abdominal retroperitoneal lymphadenopathy.  There is evidence of pancreatic divisum with the pancreatic duct  superior to the common bile duct.          Impression:       Inflammation identified of the body and tail the pancreas  with surrounding fluid extending into the paracolic gutters bilaterally.  Findings most consistent with acute pancreatitis. There is evidence of  pancreatic divisum. There is no definite underlying mass. No  intrahepatic or extra hepatic biliary ductal dilatation. No stones in  the gallbladder however there is mild distention of the gallbladder.        This report was finalized on 1/14/2018 12:02 PM by Dr. Bere Browning MD.                I have reviewed the medications.    Assessment/Plan   Assessment / Plan     Hospital Problem List     * (Principal)Acute pancreatitis    Hyperlipidemia    Hypertension    Pancreas divisum             Brief Hospital Course to date:  Nimesh Muñoz is a 62 y.o. male with PMHx of HTN and HL presented to [facility] ED with 1 day hx of N/V abdominal pain, found to have pancreatitis.  No known risk factors for  pancreatitis.  GB U/S and MRCP negative for CBD stones, inflammation at tail of pancreas only and his MCRP shows evidence of pancreas divisum which is likely his cause of pancreatitis.    Assessment & Plan:  - continue supportive care with IVF's and sip/chips  - trial clears later today, advance to low fat diet tomorrow am  - GI follows but based on MRCP no need for ERCP, pt has pancraes divisum and will need f/u with Dr. Ford outpatient GI in 2 weeks after d/c for consideration of sphincterotomy     DVT Prophylaxis:  ambulate    CODE STATUS: Full Code    Disposition: I expect the patient to be discharged home tomorrow.    Margo Saldana MD  01/14/18  1:30 PM

## 2018-01-14 NOTE — PLAN OF CARE
Problem: Pain, Acute (Adult)  Goal: Identify Related Risk Factors and Signs and Symptoms  Outcome: Ongoing (interventions implemented as appropriate)   01/13/18 9205   Pain, Acute   Related Risk Factors (Acute Pain) fear   Signs and Symptoms (Acute Pain) verbalization of pain descriptors

## 2018-01-14 NOTE — PROGRESS NOTES
"GI Daily Progress Note  Subjective:    Pain well-controlled. No nausea or vomiting. Appetite is starting to improve. No BM since admission.    Objective:    /84  Pulse 52  Temp 98.3 °F (36.8 °C) (Oral)   Resp 18  Ht 170.2 cm (67\")  Wt 77.2 kg (170 lb 4.8 oz)  SpO2 100%  BMI 26.67 kg/m2    Physical Exam   Constitutional: He is oriented to person, place, and time. He appears well-developed and well-nourished.   HENT:   Head: Normocephalic.   Eyes: Conjunctivae are normal. No scleral icterus.   Neck: Normal range of motion.   Cardiovascular: Regular rhythm.    Mild bradycardia.   Pulmonary/Chest: Effort normal and breath sounds normal. No respiratory distress. He has no wheezes.   Abdominal: Soft. Bowel sounds are normal. He exhibits no distension. There is no tenderness.   Musculoskeletal: Normal range of motion. He exhibits no edema.   Neurological: He is alert and oriented to person, place, and time.   Skin: Skin is warm and dry. No rash noted.   Psychiatric: He has a normal mood and affect. His behavior is normal.   Nursing note and vitals reviewed.      Lab  Lab Results   Component Value Date    WBC 10.10 01/14/2018    HGB 14.0 01/14/2018    HGB 15.7 01/13/2018    MCV 87.2 01/14/2018     01/14/2018       Lab Results   Component Value Date    GLUCOSE 90 01/14/2018    BUN 16 01/14/2018    CREATININE 0.80 01/14/2018    EGFRIFNONA 98 01/14/2018    BCR 20.0 01/14/2018    CO2 23.0 01/14/2018    CALCIUM 8.6 (L) 01/14/2018    ALBUMIN 3.90 01/14/2018    AST 20 01/14/2018    ALT 29 01/14/2018     Results for LEXX NOONAN (MRN [id number]) as of 1/14/2018 13:25   Ref. Range 1/13/2018 07:38 1/14/2018 08:40   Lipase Latest Ref Range: 6 - 51 U/L 565 (H) 349 (H)     GB US is normal.  Triglycerides = 59        Assessment:    Mild acute interstitial pancreatitis, clinically improving.  Nausea and vomiting, resolved.  Epigastric pain, controlled with opiates, improving.  Slow-transit constipation due to " opiates.    Discussion: Personal review of MRCP shows pancreas divisum and inflammation at tail and mid body of pancreas. Biliary tree is normal. Gallstone pancreatitis in tail of pancreas is virtually impossible with pancreas divisum anatomy. TO REVIEW: Patient rarely drinks alcohol, and not recently. Has no history of recent abdominal trauma. Takes no OTC medications or herbal remedies. Triglycerides and calcium level are normal.    In view of pancreatitis location and lack of other etiology, suspect patient's pancreatitis is due to pancreas divisum. Much less likely is side effect of HCTZ or ramipril.     Plan:    >> Recommend f/u in 2 weeks with Dr. Efrain Fernández MD for consideration of minor sphincterotomy.  >> Recommend patient discuss medications with Dr. Saul, regarding low likelihood his antihypertensives could cause pancreatitis and whether alternative medications are appropriate.  >> Though alcohol is not a likely contributor, it may lower threshold for pancreatitis. Recommend abstinence from alcohol to eliminate this variable.  >> Clear liquid diet and advance to low-fat as tolerated.  >> Add Senokot    Anticipate home tomorrow. Discussed case in detail with Dr. Saldana, patient, and wife.    Mark I. Brunner, MD  01/14/18  1:15 PM

## 2018-01-14 NOTE — PLAN OF CARE
Problem: Pain, Acute (Adult)  Goal: Identify Related Risk Factors and Signs and Symptoms  Outcome: Ongoing (interventions implemented as appropriate)   01/14/18 1839   Pain, Acute   Related Risk Factors (Acute Pain) patient perception   Signs and Symptoms (Acute Pain) verbalization of pain descriptors     Goal: Acceptable Pain Control/Comfort Level  Outcome: Ongoing (interventions implemented as appropriate)   01/14/18 1839   Pain, Acute (Adult)   Acceptable Pain Control/Comfort Level making progress toward outcome

## 2018-01-14 NOTE — PLAN OF CARE
Problem: Pain, Acute (Adult)  Goal: Acceptable Pain Control/Comfort Level  Outcome: Ongoing (interventions implemented as appropriate)   01/14/18 0338   Pain, Acute (Adult)   Acceptable Pain Control/Comfort Level making progress toward outcome

## 2018-01-15 VITALS
HEART RATE: 58 BPM | SYSTOLIC BLOOD PRESSURE: 144 MMHG | HEIGHT: 67 IN | OXYGEN SATURATION: 100 % | RESPIRATION RATE: 16 BRPM | DIASTOLIC BLOOD PRESSURE: 77 MMHG | BODY MASS INDEX: 26.73 KG/M2 | TEMPERATURE: 98.5 F | WEIGHT: 170.3 LBS

## 2018-01-15 PROCEDURE — 99239 HOSP IP/OBS DSCHRG MGMT >30: CPT | Performed by: PHYSICIAN ASSISTANT

## 2018-01-15 RX ORDER — HYDROCODONE BITARTRATE AND ACETAMINOPHEN 5; 325 MG/1; MG/1
1 TABLET ORAL EVERY 4 HOURS PRN
Qty: 18 TABLET | Refills: 0 | Status: SHIPPED | OUTPATIENT
Start: 2018-01-15 | End: 2018-01-18

## 2018-01-15 RX ORDER — SENNA PLUS 8.6 MG/1
1 TABLET ORAL 2 TIMES DAILY
Qty: 30 TABLET | Refills: 0 | Status: SHIPPED | OUTPATIENT
Start: 2018-01-15 | End: 2019-01-16 | Stop reason: HOSPADM

## 2018-01-15 RX ORDER — POLYETHYLENE GLYCOL 3350 17 G/17G
17 POWDER, FOR SOLUTION ORAL DAILY PRN
Qty: 24 EACH | Refills: 0 | Status: SHIPPED | OUTPATIENT
Start: 2018-01-15 | End: 2019-01-16 | Stop reason: HOSPADM

## 2018-01-15 RX ADMIN — HYDROCODONE BITARTRATE AND ACETAMINOPHEN 1 TABLET: 5; 325 TABLET ORAL at 06:48

## 2018-01-15 RX ADMIN — HYDROCHLOROTHIAZIDE 12.5 MG: 12.5 TABLET ORAL at 09:16

## 2018-01-15 RX ADMIN — RAMIPRIL 10 MG: 5 CAPSULE ORAL at 09:16

## 2018-01-15 RX ADMIN — HYDROCODONE BITARTRATE AND ACETAMINOPHEN 1 TABLET: 5; 325 TABLET ORAL at 11:16

## 2018-01-15 RX ADMIN — SODIUM CHLORIDE 100 ML/HR: 9 INJECTION, SOLUTION INTRAVENOUS at 09:20

## 2018-01-15 RX ADMIN — HYDROCODONE BITARTRATE AND ACETAMINOPHEN 1 TABLET: 5; 325 TABLET ORAL at 02:39

## 2018-01-15 NOTE — PROGRESS NOTES
Discharge Planning Assessment  [facility]     Patient Name: Nimesh Muñoz  MRN: [id number]  Today's Date: 1/15/2018    Admit Date: 1/13/2018          Discharge Needs Assessment       01/15/18 0938    Living Environment    Lives With spouse    Living Arrangements house    Home Accessibility bed and bath on same level    Stair Railings at Home inside, present at both sides    Type of Financial/Environmental Concern none    Transportation Available car;family or friend will provide    Living Environment    Provides Primary Care For no one    Quality Of Family Relationships supportive    Able to Return to Prior Living Arrangements yes    Discharge Needs Assessment    Concerns To Be Addressed adjustment to diagnosis/illness concerns;basic needs concerns    Readmission Within The Last 30 Days no previous admission in last 30 days    Equipment Currently Used at Home none            Discharge Plan       01/15/18 0939    Case Management/Social Work Plan    Plan Home    Patient/Family In Agreement With Plan yes    Additional Comments Talked to Mr. Muñoz at .  He lives c his wife in [place] in 2 story home.  Bedrooms are on 2nd level.  He is independent c ADL's at home.  He does not use DME, HH or Home O2.  His PCP is Dr. Yuri Saul.  He uses [facility] in [facility].  His goal is to return home c assist from his wife.  CM will follow.        Discharge Placement     No information found                Demographic Summary       01/15/18 8149    Referral Information    Referral Source admission list    Reason For Consult discharge planning    Contact Information    Permission Granted to Share Information With     Primary Care Physician Information    Name Dr. Yuri Saul            Functional Status       01/15/18 0937    Functional Status Current    Ambulation 0-->independent    Transferring 0-->independent    Toileting 0-->independent    Bathing 0-->independent    Dressing 0-->independent    Eating  0-->independent    Communication 0-->understands/communicates without difficulty    Swallowing (if score 2 or more for any item, consult Rehab Services) 0-->swallows foods/liquids without difficulty    Functional Status Prior    Ambulation 0-->independent    Transferring 0-->independent    Toileting 0-->independent    Bathing 0-->independent    Dressing 0-->independent    Eating 0-->independent    Communication 0-->understands/communicates without difficulty    Swallowing 0-->swallows foods/liquids without difficulty    IADL    Medications independent    Meal Preparation independent    Housekeeping independent    Laundry independent    Shopping independent    Oral Care independent            Psychosocial     None            Abuse/Neglect     None            Legal     None            Substance Abuse     None            Patient Forms     None          Mykel Nunez RN

## 2018-01-15 NOTE — DISCHARGE SUMMARY
[facility]  DISCHARGE SUMMARY    Patient Name: Nimesh Muñoz  : 1955  MRN: [id number]    Date of Admission: 2018  Date of Discharge:  1/15/18  Primary Care Physician: Yuri Saul MD    Consults     Date and Time Order Name Status Description    2018 1427 Inpatient Consult to Gastroenterology Completed         Hospital Course     Presenting Problem:   Acute pancreatitis, unspecified complication status, unspecified pancreatitis type [K85.90]    Active Hospital Problems (** Indicates Principal Problem)    Diagnosis Date Noted   • **Acute pancreatitis [K85.90] 2018   • Pancreas divisum [Q45.3] 2018   • Hyperlipidemia [E78.5]    • Hypertension [I10]       Resolved Hospital Problems    Diagnosis Date Noted Date Resolved   No resolved problems to display.          Hospital Course:  Nimesh Muñoz is a 62 y.o. male with PMH of HTN, HLD who presented to [facility] 18 with nonradiating epigastric pain a/w nausea and decreased appetite. Reports 2-3 beers/week. Lipase 565, CT abd /pelvis showed pancreatitic inflammation. GB U/S negative for acute findings.TG 59. GI was consulted, MRCP was performed showed inflammation in the body and tail of pancreas with surrounding fluid. Noted to have pancreatic divisum.   Lipase improved to 349. Diet has been advanced to low fat with good tolerance, still having residual abd pain but responds to pain rx. Pain not worsened by po intake, no n/v/d. Dr Brunner, GI recommends pt f/u with Dr Efrain Fernández in 2 wks to consideration of minor sphincterotomy. Pt to f/u with Dr Saul this week for post hospitalization evaluation. Dr Brunner does not suspect Antihypertensive medications are contributing to pancreatitis. Recommends continue to abstain for ETOH. Follow low fat diet. Norco 5mg prn for pain control. Senokot with prn miralax for constipation.      The patient is afebrile, no leukocytosis, abdominal pain  improving and tolerating advance of diet.  Patient is clinically improved and medically appropriate for discharge on close follow-up with PCP as well as GI.       Day of Discharge     HPI:   Patient is resting in bed at time of visit.  Reports tolerating breakfast well without worsening of abdominal pain or nausea vomiting diarrhea.  Still having some residual epigastric pain intermittently, not aggravated by diet.  He denies fever chills myalgias.  No new complaints.  He would like to go home today.  Agrees with alcohol cessation, understands importance of a low-fat diet.  No further questions at this time    Review of Systems  Gen- No fevers, chills  CV- No chest pain, palpitations  Resp- No cough, dyspnea  GI- epigastric abdominal pain.  No N/V/D      Otherwise ROS is negative except as mentioned in the HPI.    Vital Signs:   Temp:  [98 °F (36.7 °C)-98.6 °F (37 °C)] 98.5 °F (36.9 °C)  Heart Rate:  [52-59] 58  Resp:  [16] 16  BP: (140-166)/(71-84) 144/77     Physical Exam:  Constitutional: No acute distress, awake, alert  Eyes: PERRLA, sclerae anicteric, no conjunctival injection  HENT: NCAT, mucous membranes moist  Neck: Supple, trachea midline  Respiratory: Clear to auscultation bilaterally, nonlabored respirations   Cardiovascular: RRR, no murmurs, rubs, or gallops, palpable pedal pulses bilaterally  Gastrointestinal: Very mild tenderness to palpation periumbilical region without rebound tenderness or guarding.  Positive bowel sounds, soft, nondistended  Musculoskeletal: No bilateral ankle edema, no clubbing or cyanosis to extremities  Psychiatric: Appropriate affect, cooperative  Neurologic: Oriented x 3, speech clear, follows commands  Skin: Warm, dry, No rashes    Pertinent  and/or Most Recent Results       Results from last 7 days  Lab Units 01/14/18  0840 01/13/18  0738   WBC 10*3/mm3 10.10 12.06*   HEMOGLOBIN g/dL 14.0 15.7   HEMATOCRIT % 41.5 45.1   PLATELETS 10*3/mm3 171 204   SODIUM mmol/L 135 135  "  POTASSIUM mmol/L 4.1 3.8   CHLORIDE mmol/L 103 102   CO2 mmol/L 23.0 20.0   BUN mg/dL 16 23   CREATININE mg/dL 0.80 1.00   GLUCOSE mg/dL 90 145*   CALCIUM mg/dL 8.6* 9.6       Results from last 7 days  Lab Units 01/14/18  0840 01/13/18  0738   BILIRUBIN mg/dL 0.9 0.5   ALK PHOS U/L 57 62   ALT (SGPT) U/L 29 39   AST (SGOT) U/L 20 28       Results from last 7 days  Lab Units 01/14/18  0840 01/13/18  0738   CHOLESTEROL mg/dL 125  --    TRIGLYCERIDES mg/dL 59 54   HDL CHOL mg/dL 56  --    LDL CHOL mg/dL 60  --        Results from last 7 days  Lab Units 01/14/18  0840 01/13/18  0738   TSH mIU/mL 2.773  --    HEMOGLOBIN A1C % 5.70*  --    TROPONIN I ng/mL  --  <0.006     Brief Urine Lab Results  (Last result in the past 365 days)      Color   Clarity   Blood   Leuk Est   Nitrite   Protein   CREAT   Urine HCG        01/13/18 0834 Yellow Clear Negative Negative Negative Trace(A)               Microbiology Results Abnormal     None          Imaging Results (all)     Procedure Component Value Units Date/Time    CT Abdomen Pelvis With Contrast [[id number]] Collected:  01/13/18 1334     Updated:  01/13/18 1454    Narrative:       EXAMINATION: CT ABDOMEN PELVIS W/CONTRAST - 01/13/2018     INDICATION: Abdominal pain, elevated Lipase.       TECHNIQUE: CT scan of the abdomen and pelvis was performed following  intravenous contrast.     The radiation dose reduction device was turned on for each scan per the  ALARA (As Low as Reasonably Achievable) protocol.     COMPARISON: None.     FINDINGS: The most superior images demonstrate no basilar pulmonary  inflammatory process or pleural effusion. The liver and spleen are  normal. There is no pancreatic mass but there is inflammation adjacent  to the tail of the pancreas and extending into the left paracolic  \"gutter\". There is no pseudocyst. There is no adrenal mass. There is no  renal mass, stone or obstruction. There is no ascites, aneurysm or  retroperitoneal lymphadenopathy. The " "appendix is normal. There are  sigmoid diverticula without features of diverticulitis. There is diffuse  enlargement of the prostate gland. There is no inguinal lymphadenopathy.       Impression:       1. There is inflammatory changes near the tail of the pancreas extending  into the left paracolic \"gutter\". There is no pancreatic solid mass or  pseudocyst.  2. There are nonacute findings including sigmoid diverticulosis and  diffuse prostatic enlargement.     DICTATED:     01/13/2018  EDITED    :     01/13/2018      This report was finalized on 1/13/2018 2:52 PM by Dr. Rachid Muñoz MD.       XR Chest 1 View [[id number]] Collected:  01/13/18 1101     Updated:  01/13/18 1454    Narrative:          EXAMINATION: XR CHEST 1 VW - 01/13/2018     INDICATION: Upper abdominal pain.      COMPARISON: None.     FINDINGS: There is a normal cardiac silhouette. There is no pulmonary  inflammatory process, mass or effusion.           Impression:       No active disease.     DICTATED:     01/13/2018  EDITED    :     01/13/2018      This report was finalized on 1/13/2018 2:52 PM by Dr. Rachid Muñoz MD.       US Gallbladder [[id number]] Collected:  01/13/18 1643     Updated:  01/13/18 1657    Narrative:       EXAMINATION: US GALLBLADDER - 01/13/2018     INDICATION:  K85.90-Acute pancreatitis without necrosis or infection,  unspecified; R10.9-Unspecified abdominal pain; D72.829-Elevated white  blood cell count, unspecified; Z76.89-Persons encountering health  services in other specified circumstances.     TECHNIQUE: Ultrasound of the right upper quadrant was performed the  longitudinal and transverse planes.     COMPARISON: None.     FINDINGS: The pancreas is largely obscured by bowel gas. The liver  reveals no focal abnormality or biliary ductal dilatation. There is  viscus echogenic bile but no evidence of екатерина cholelithiasis. There is  no edema of the gallbladder wall. The common bile duct is normal with an  AP dimension of 6 mm. The " right kidney is normal with a sagittal  dimension of 10.5 cm.       Impression:       Negative ultrasound of the gallbladder.     DICTATED:     01/13/2018  EDITED    :     01/13/2018          This report was finalized on 1/13/2018 4:55 PM by Dr. Rachid Muñoz MD.       MRI abdomen wo contrast mrcp [[id number]] Collected:  01/14/18 0853     Updated:  01/14/18 1204    Narrative:       EXAMINATION: MRI ABDOMEN WO CONTRAST MRCP-      INDICATION: Idiopathic acute pancreatitis; K85.90-Acute pancreatitis  without necrosis or infection, unspecified; R10.9-Unspecified abdominal  pain; D72.829-Elevated white blood cell count, unspecified;  Z76.89-Persons encountering health services in other specified  circumstances mid abdominal pain     TECHNIQUE: Routine multiple imaging is obtained of the abdomen without  the ministration gadolinium contrast. MRCP imaging was also obtained of  the abdomen.        COMPARISON: CT scan dated 01/13/2018     FINDINGS: There is fluid seen surrounding the body and tail the pancreas  with extension of the fluid predominantly into the left paracolic gutter  and left upper quadrant. There is minimal fluid identified surrounding  the inferior aspect of the liver. The gallbladder is distended. No  definite filling defect to suggest stones. The liver is homogeneous in  appearance as well as the spleen. There is a renal cortical cysts seen  at the inferior aspect of the left kidney. There is no obstruction of  the kidneys. Both adrenal glands are within normal limits. No evidence  of dilatation identified of the biliary ductal system. No interpedicular  extrahepatic biliary ductal dilatation. No filling defects seen in the  to suggest evidence of a stone. The pancreatic duct is normal in  caliber. The visualized portions of the gastrointestinal tract reveal  stool within the colon. No abdominal retroperitoneal lymphadenopathy.  There is evidence of pancreatic divisum with the pancreatic duct  superior to  the common bile duct.          Impression:       Inflammation identified of the body and tail the pancreas  with surrounding fluid extending into the paracolic gutters bilaterally.  Findings most consistent with acute pancreatitis. There is evidence of  pancreatic divisum. There is no definite underlying mass. No  intrahepatic or extra hepatic biliary ductal dilatation. No stones in  the gallbladder however there is mild distention of the gallbladder.        This report was finalized on 1/14/2018 12:02 PM by Dr. eBre Browning MD.                    Discharge Details      Nimesh Muñoz   [place] Medication Instructions TONY:[id number]    Printed on:01/15/18 0953   Medication Information                      aspirin 81 MG EC tablet  Take 81 mg by mouth Daily.             Cholecalciferol (VITAMIN D) 2000 units tablet  Take 2,000 Units by mouth Daily.             hydrochlorothiazide (HYDRODIURIL) 12.5 MG tablet  Take 12.5 mg by mouth Daily.             HYDROcodone-acetaminophen (NORCO) 5-325 MG per tablet  Take 1 tablet by mouth Every 4 (Four) Hours As Needed for Moderate Pain  for up to 3 days.             polyethylene glycol (MIRALAX) packet  Take 17 g by mouth Daily As Needed (constipation).             pravastatin (PRAVACHOL) 40 MG tablet  Take 40 mg by mouth Daily.             ramipril (ALTACE) 10 MG capsule  Take 10 mg by mouth Daily.             senna (SENOKOT) 8.6 MG tablet  Take 1 tablet by mouth 2 (Two) Times a Day. May discontinue once constipation resolves                   Discharge Disposition:      Discharge Diet:  Diet Instructions     Diet: Specialty Diet; Thin Liquids, No Restrictions; Low Fat       Discharge Diet:  Specialty Diet   Fluid Consistency:  Thin Liquids, No Restrictions   Specialty Diets:  Low Fat   No alcohol                 Discharge Activity:   Activity Instructions     Activity as Tolerated                       No future appointments.    Additional Instructions for the Follow-ups  that You Need to Schedule     Discharge Follow-up with PCP    As directed    Follow Up Details:  please schedule f/u with Dr Saul for post hospitalization evaluation for pancreatitis to be seen this week.           Discharge Follow-up with Specialty: Dr Efrain Fernández, GI; 2 Weeks    As directed    Specialty:  Dr Efrain Fernández, GI    Follow Up:  2 Weeks    Follow Up Details:  please schedule 2 week follow up with evaluation for minor sphincterotomy, found to have pancreatitis with pancreatic divisum                     Time Spent on Discharge:  40 minutes    Casie M Mayne, PA-C  01/15/18  9:53 AM

## 2018-01-15 NOTE — PLAN OF CARE
Problem: Pain, Acute (Adult)  Goal: Identify Related Risk Factors and Signs and Symptoms   01/14/18 1839   Pain, Acute   Related Risk Factors (Acute Pain) patient perception   Signs and Symptoms (Acute Pain) verbalization of pain descriptors       Problem: Patient Care Overview (Adult)  Goal: Plan of Care Review   01/15/18 0625   Coping/Psychosocial Response Interventions   Plan Of Care Reviewed With patient   Patient Care Overview   Progress improving   Outcome Evaluation   Outcome Summary/Follow up Plan Pain controlled with PO medications. Rested comfortably this shift.

## 2018-01-15 NOTE — PLAN OF CARE
Problem: Patient Care Overview (Adult)  Goal: Plan of Care Review  Outcome: Ongoing (interventions implemented as appropriate)   01/15/18 1053   Coping/Psychosocial Response Interventions   Plan Of Care Reviewed With patient   Patient Care Overview   Progress improving   Outcome Evaluation   Outcome Summary/Follow up Plan patient being discharged home today

## 2018-02-23 ENCOUNTER — HOSPITAL ENCOUNTER (OUTPATIENT)
Facility: HOSPITAL | Age: 63
Setting detail: HOSPITAL OUTPATIENT SURGERY
Discharge: HOME | End: 2018-02-23
Attending: INTERNAL MEDICINE | Admitting: INTERNAL MEDICINE

## 2018-02-23 ENCOUNTER — ANESTHESIA (OUTPATIENT)
Dept: GASTROENTEROLOGY | Facility: HOSPITAL | Age: 63
End: 2018-02-23

## 2018-02-23 ENCOUNTER — APPOINTMENT (OUTPATIENT)
Dept: GENERAL RADIOLOGY | Facility: HOSPITAL | Age: 63
End: 2018-02-23

## 2018-02-23 ENCOUNTER — ANESTHESIA EVENT (OUTPATIENT)
Dept: GASTROENTEROLOGY | Facility: HOSPITAL | Age: 63
End: 2018-02-23

## 2018-02-23 VITALS
HEIGHT: 69 IN | HEART RATE: 43 BPM | TEMPERATURE: 97 F | SYSTOLIC BLOOD PRESSURE: 168 MMHG | OXYGEN SATURATION: 100 % | WEIGHT: 160 LBS | RESPIRATION RATE: 16 BRPM | DIASTOLIC BLOOD PRESSURE: 73 MMHG | BODY MASS INDEX: 23.7 KG/M2

## 2018-02-23 LAB — POTASSIUM BLDA-SCNC: 3.92 MMOL/L (ref 3.5–5.3)

## 2018-02-23 PROCEDURE — C1769 GUIDE WIRE: HCPCS | Performed by: INTERNAL MEDICINE

## 2018-02-23 PROCEDURE — 74330 X-RAY BILE/PANC ENDOSCOPY: CPT

## 2018-02-23 PROCEDURE — 84132 ASSAY OF SERUM POTASSIUM: CPT | Performed by: INTERNAL MEDICINE

## 2018-02-23 PROCEDURE — 25010000002 PROPOFOL 1000 MG/ML EMULSION: Performed by: NURSE ANESTHETIST, CERTIFIED REGISTERED

## 2018-02-23 RX ORDER — PROMETHAZINE HYDROCHLORIDE 25 MG/ML
6.25 INJECTION, SOLUTION INTRAMUSCULAR; INTRAVENOUS ONCE AS NEEDED
Status: DISCONTINUED | OUTPATIENT
Start: 2018-02-23 | End: 2018-02-23 | Stop reason: HOSPADM

## 2018-02-23 RX ORDER — SODIUM CHLORIDE 0.9 % (FLUSH) 0.9 %
1-10 SYRINGE (ML) INJECTION AS NEEDED
Status: DISCONTINUED | OUTPATIENT
Start: 2018-02-23 | End: 2018-02-23 | Stop reason: HOSPADM

## 2018-02-23 RX ORDER — IPRATROPIUM BROMIDE AND ALBUTEROL SULFATE 2.5; .5 MG/3ML; MG/3ML
3 SOLUTION RESPIRATORY (INHALATION) ONCE AS NEEDED
Status: DISCONTINUED | OUTPATIENT
Start: 2018-02-23 | End: 2018-02-23 | Stop reason: HOSPADM

## 2018-02-23 RX ORDER — FAMOTIDINE 20 MG/1
20 TABLET, FILM COATED ORAL ONCE
Status: DISCONTINUED | OUTPATIENT
Start: 2018-02-23 | End: 2018-02-23 | Stop reason: HOSPADM

## 2018-02-23 RX ORDER — LABETALOL HYDROCHLORIDE 5 MG/ML
5 INJECTION, SOLUTION INTRAVENOUS
Status: DISCONTINUED | OUTPATIENT
Start: 2018-02-23 | End: 2018-02-23 | Stop reason: HOSPADM

## 2018-02-23 RX ORDER — PROMETHAZINE HYDROCHLORIDE 25 MG/1
25 TABLET ORAL ONCE AS NEEDED
Status: DISCONTINUED | OUTPATIENT
Start: 2018-02-23 | End: 2018-02-23 | Stop reason: HOSPADM

## 2018-02-23 RX ORDER — SODIUM CHLORIDE, SODIUM LACTATE, POTASSIUM CHLORIDE, CALCIUM CHLORIDE 600; 310; 30; 20 MG/100ML; MG/100ML; MG/100ML; MG/100ML
9 INJECTION, SOLUTION INTRAVENOUS CONTINUOUS
Status: DISCONTINUED | OUTPATIENT
Start: 2018-02-23 | End: 2018-02-23 | Stop reason: HOSPADM

## 2018-02-23 RX ORDER — PROMETHAZINE HYDROCHLORIDE 25 MG/1
25 SUPPOSITORY RECTAL ONCE AS NEEDED
Status: DISCONTINUED | OUTPATIENT
Start: 2018-02-23 | End: 2018-02-23 | Stop reason: HOSPADM

## 2018-02-23 RX ORDER — FAMOTIDINE 10 MG/ML
20 INJECTION, SOLUTION INTRAVENOUS ONCE
Status: COMPLETED | OUTPATIENT
Start: 2018-02-23 | End: 2018-02-23

## 2018-02-23 RX ORDER — LIDOCAINE HYDROCHLORIDE 10 MG/ML
0.5 INJECTION, SOLUTION EPIDURAL; INFILTRATION; INTRACAUDAL; PERINEURAL ONCE AS NEEDED
Status: DISCONTINUED | OUTPATIENT
Start: 2018-02-23 | End: 2018-02-23 | Stop reason: HOSPADM

## 2018-02-23 RX ORDER — HYDRALAZINE HYDROCHLORIDE 20 MG/ML
5 INJECTION INTRAMUSCULAR; INTRAVENOUS
Status: DISCONTINUED | OUTPATIENT
Start: 2018-02-23 | End: 2018-02-23 | Stop reason: HOSPADM

## 2018-02-23 RX ORDER — ONDANSETRON 2 MG/ML
4 INJECTION INTRAMUSCULAR; INTRAVENOUS ONCE AS NEEDED
Status: DISCONTINUED | OUTPATIENT
Start: 2018-02-23 | End: 2018-02-23 | Stop reason: HOSPADM

## 2018-02-23 RX ADMIN — FAMOTIDINE 20 MG: 10 INJECTION, SOLUTION INTRAVENOUS at 11:51

## 2018-02-23 RX ADMIN — SODIUM CHLORIDE, POTASSIUM CHLORIDE, SODIUM LACTATE AND CALCIUM CHLORIDE 9 ML/HR: 600; 310; 30; 20 INJECTION, SOLUTION INTRAVENOUS at 11:51

## 2018-02-23 RX ADMIN — PROPOFOL 150 MCG/KG/MIN: 10 INJECTION, EMULSION INTRAVENOUS at 13:05

## 2018-02-23 NOTE — DISCHARGE INSTRUCTIONS
Return to see Dr Fernández in 2 months, sooner if signs of pancreatitis develop. Continue pancreatic enzymes

## 2018-02-23 NOTE — H&P
SEE OFFICE H&P:  Patient with recent bout of acute pancreatitis with well-documented Pancreas Divisum on MRCP presents for ERCP for minor papilla sphincterotomy and stenting.\  Unchanged H&P from recent visit.   Currently asymptomatic.  Understands risks of severe pancreatitis, perforation, bleeding,potential hospitalization/morbidity.   Wishes to proceed.   PLAN:  ERCP.

## 2018-02-23 NOTE — H&P
SEE OFFICE H&P  Patient with recurrent hemorrhoidal bleeding presents for colonoscopy and likely hemorrhoids banding.   PLAN: Colonoscopy likely banding.

## 2019-01-16 ENCOUNTER — OFFICE VISIT (OUTPATIENT)
Dept: GASTROENTEROLOGY | Facility: CLINIC | Age: 64
End: 2019-01-16

## 2019-01-16 ENCOUNTER — LAB (OUTPATIENT)
Dept: LAB | Facility: HOSPITAL | Age: 64
End: 2019-01-16

## 2019-01-16 VITALS
BODY MASS INDEX: 24.73 KG/M2 | SYSTOLIC BLOOD PRESSURE: 133 MMHG | HEART RATE: 52 BPM | WEIGHT: 167 LBS | HEIGHT: 69 IN | DIASTOLIC BLOOD PRESSURE: 81 MMHG

## 2019-01-16 DIAGNOSIS — Q45.3 PANCREAS DIVISUM: Primary | ICD-10-CM

## 2019-01-16 DIAGNOSIS — Q45.3 PANCREAS DIVISUM: ICD-10-CM

## 2019-01-16 LAB
AMYLASE SERPL-CCNC: 102 U/L (ref 30–118)
LIPASE SERPL-CCNC: 59 U/L (ref 6–51)

## 2019-01-16 PROCEDURE — 36415 COLL VENOUS BLD VENIPUNCTURE: CPT

## 2019-01-16 PROCEDURE — 83690 ASSAY OF LIPASE: CPT

## 2019-01-16 PROCEDURE — 82150 ASSAY OF AMYLASE: CPT

## 2019-01-16 PROCEDURE — 99212 OFFICE O/P EST SF 10 MIN: CPT | Performed by: INTERNAL MEDICINE

## 2019-01-16 RX ORDER — PANCRELIPASE 60000; 12000; 38000 [USP'U]/1; [USP'U]/1; [USP'U]/1
CAPSULE, DELAYED RELEASE PELLETS ORAL
COMMUNITY
Start: 2018-11-25

## 2019-01-16 RX ORDER — AMLODIPINE BESYLATE 5 MG/1
TABLET ORAL
COMMUNITY
Start: 2018-11-09

## 2019-01-16 NOTE — PROGRESS NOTES
GASTROENTEROLOGY OFFICE NOTE  Nimesh Muñoz  [id number]  1955    CARE TEAM  Patient Care Team:  Yuri Saul MD as PCP - General    No ref. provider found     Chief Complaint   Patient presents with   • Follow-up       Isolated bout of pancreatitis secondary to pancreas divisum without evidence of recurrent  pancreatitis.        HISTORY OF PRESENT ILLNESS:  Mr. Muñoz is a very pleasant and overall healthy 63-year-old white male who I met last year.  He had an isolated bout of uncomplicated pancreatitis which was felt to be due to pancreas divisum identified on MRCP.  ERCP was attempted but minor papilla cannulation failed.  Conservative management was then initiated with complete abstinence from alcohol and maintenance therapy with pancreatic enzymes.    He presents today in the absence of any thing to suggest recurrent pancreatitis is done very well.  He's been adherent abstinence from alcohol and has taken his pancreatic enzymes faithfully.    He presents without any abdominal pain, nausea, vomiting, melena, bright blood per rectum, changes in bowel habits, abnormal weight loss, jaundice, dark urine or anything to suggest any pancreatic or biliary pathology.  He is wanting to stop his hepatic enzymes and is inquiring as to whether he can start drinking alcohol again.     PAST MEDICAL HISTORY  Past Medical History:   Diagnosis Date   • Hyperlipidemia    • Hypertension    • Left ACL tear    • Sleep apnea     wears sleep apnea bite guard    • Sleep apnea         PAST SURGICAL HISTORY  Past Surgical History:   Procedure Laterality Date   • COLONOSCOPY  2014   • ERCP N/A 2/23/2018    Procedure: ENDOSCOPIC RETROGRADE CHOLANGIOPANCREATOGRAPHY;  Surgeon: Judd Fernández MD;  Location: [facility] ENDOSCOPY;  Service:    • HAND TENDON SURGERY Right    • VASECTOMY  1987        MEDICATIONS:    Current Outpatient Medications:   •  amLODIPine (NORVASC) 5 MG tablet, , Disp: , Rfl:   •  aspirin 81 MG EC  tablet, Take 81 mg by mouth Daily., Disp: , Rfl:   •  Cholecalciferol (VITAMIN D) 2000 units tablet, Take 2,000 Units by mouth Daily., Disp: , Rfl:   •  CREON 45505 units capsule delayed-release particles capsule, , Disp: , Rfl:   •  hydrochlorothiazide (HYDRODIURIL) 12.5 MG tablet, Take 12.5 mg by mouth Daily., Disp: , Rfl:   •  pravastatin (PRAVACHOL) 40 MG tablet, Take 40 mg by mouth Daily., Disp: , Rfl:   •  ramipril (ALTACE) 10 MG capsule, Take 10 mg by mouth Daily., Disp: , Rfl:     ALLERGIES  Allergies   Allergen Reactions   • Penicillins Hives       FAMILY HISTORY:  Family History   Problem Relation Age of Onset   • Alzheimer's disease Mother    • Heart disease Father        SOCIAL HISTORY  Social History     Socioeconomic History   • Marital status:      Spouse name: Caro   • Number of children: 2   • Years of education: College   • Highest education level: Not on file   Occupational History   • Occupation: [employer]     Employer: RETIRED   Tobacco Use   • Smoking status: Never Smoker   • Smokeless tobacco: Never Used   Substance and Sexual Activity   • Alcohol use: No     Frequency: Never   • Drug use: No   • Sexual activity: Yes     Partners: Female     Socioeconomic History: He is retired.  He used to work for [employer].  He has a son and a daughter and 2 grandchildren.         REVIEW OF SYSTEMS  Review of Systems   Constitutional: Negative for unexpected weight loss.   HENT: Negative for trouble swallowing.    Eyes: Negative.    Respiratory: Negative.    Gastrointestinal: Negative for abdominal distention, abdominal pain, anal bleeding, blood in stool, constipation, diarrhea, nausea, rectal pain, vomiting, GERD and indigestion.   Endocrine: Negative.    Genitourinary: Negative.    Musculoskeletal: Negative.    Skin: Negative.    Allergic/Immunologic: Negative.    Neurological: Negative.    Hematological: Negative.    Psychiatric/Behavioral: Negative.        PHYSICAL EXAM   BP  "133/81 (BP Location: Right arm, Patient Position: Sitting, Cuff Size: Adult)   Pulse 52   Ht 174 cm (68.5\")   Wt 75.8 kg (167 lb)   BMI 25.02 kg/m²   General: Alert and oriented x 3. In no apparent or acute distress.  and No stigmata of chronic liver disease  HEENT: Anicteric slcera. Normal oropharynx  Neck: Supple. Without lymphadenopathy  CV: Regular rate and rhythm, S1, S2  Lungs: Clear to ausculation. Without rales, robchi and wheezing  Abdomen:  Soft,non-distended without palpable masses or hepatosplenomeagaly, areas of rebound tenderness or guarding.   Extremeties: without clubbing, cyanosis or edema  Neurologic:  Alert and oriented x 3 without focal motor or sensory deficits  Rectal exam: deferred     Results for orders placed or performed during the hospital encounter of 02/23/18   OR Potassium   Result Value Ref Range    Potassium, OR 3.92 3.5 - 5.3 mmol/L        Results Review:  I reviewed the patient's new clinical results.      ASSESSMENT  1.-Pancreas disease and with isolated bout of pancreatitis status post failed ERCP.  He has done extremely well with conservative management and the need for intervention is nonexistent.  I think he can stop his Creon and he can start drinking alcohol albeit I would like him to limit his alcohol intake to very light drinking and we reviewed except what that means.    He has no problems with these recommendations and understands that at the earliest onset of anything that might suggest recurrent pancreatitis he needs to give us call so we can reassess his amylase, lipase and consider repeat attempt at minor papilla sphincterotomy.    PLAN  1.-Discontinue Creon  2.-Patient can resume very light alcohol intake  3.-Recheck amylase and lipase  4.-Return appointment as needed.      I discussed the patients findings and my recommendations with patient    Judd Fernández MD  1/16/2019   3:25 PM    Much of this note is an electronic transcription of spoken " language to printed text. Electronic transcription of spoken language may permit erroneous, nonsensical word phrases to be inadvertently transcribed.  Although I have reviewed the note for these errors, some may still be present.

## 2020-05-15 ENCOUNTER — OFFICE VISIT (OUTPATIENT)
Dept: GASTROENTEROLOGY | Facility: CLINIC | Age: 65
End: 2020-05-15

## 2020-05-15 VITALS
HEIGHT: 67 IN | WEIGHT: 160.2 LBS | HEART RATE: 83 BPM | DIASTOLIC BLOOD PRESSURE: 60 MMHG | SYSTOLIC BLOOD PRESSURE: 142 MMHG | BODY MASS INDEX: 25.15 KG/M2

## 2020-05-15 DIAGNOSIS — R74.8 ELEVATED LIPASE: ICD-10-CM

## 2020-05-15 DIAGNOSIS — Q45.3 PANCREAS DIVISUM: Primary | ICD-10-CM

## 2020-05-15 DIAGNOSIS — K85.90 ACUTE PANCREATITIS, UNSPECIFIED COMPLICATION STATUS, UNSPECIFIED PANCREATITIS TYPE: ICD-10-CM

## 2020-05-15 PROCEDURE — 99214 OFFICE O/P EST MOD 30 MIN: CPT | Performed by: INTERNAL MEDICINE

## 2020-05-15 NOTE — PROGRESS NOTES
GASTROENTEROLOGY OFFICE NOTE  Nimesh Muñoz  [id number]  1955    CARE TEAM  Patient Care Team:  Yuri Saul MD as PCP - General    No ref. provider found     Chief Complaint   Patient presents with   • Abnormal Lab levels     Dr. Saul wanted me to come in.        HISTORY OF PRESENT ILLNESS:  Patient known to me.  64-year-old white male with pancreas divisum and a single bout of pancreatitis 2 years ago.    His initial presentation was in January 2018 when he had a 3-day hospitalization for acute pancreatitis with lipase elevated to 565 (high normal 349).  Imaging studies did reveal mild acute interstitial pancreatitis.    He presents today with a second bout of pancreatitis which was characterized by upper abdominal pain and bloating.  He fasted for about 2 days and the pain resolved with no further intervention.  He had a mild elevation of his lipase to 56 units/L (high normal 51) in January but he tells me he had a more marked elevation around the time of his symptoms on May 5 in the 12 his lipase was repeated but I do not have those results immediately available.  That said outside of his 2-day fast he has been entirely asymptomatic denying dysphagia, odynophagia, early satiety, unexplained weight loss, melena, bright red blood per rectum, constipation, diarrhea, acholic stools, jaundice, or any other localizing complaints.    We had started him on Creon and we had him on that for all of 2018 and during that time he did not have another bout for most of 2019 he was off pancreatic enzymes and also did well he presents today as noted above with his second mild self-limited bout of pancreatitis.    ERCP attempted 2 years ago failed in cannulating the minor papilla aggressive attempts were not pursued given his single bout of pancreatitis and uncomplicated clinical course at that time.    He is here today referred by his primary care physician for further work-up and evaluation    PAST MEDICAL  HISTORY  Past Medical History:   Diagnosis Date   • Hyperlipidemia    • Hypertension    • Left ACL tear    • Sleep apnea     wears sleep apnea bite guard    • Sleep apnea         PAST SURGICAL HISTORY  Past Surgical History:   Procedure Laterality Date   • COLONOSCOPY  2014   • ERCP N/A 2/23/2018    Procedure: ENDOSCOPIC RETROGRADE CHOLANGIOPANCREATOGRAPHY;  Surgeon: Judd Fernández MD;  Location: [facility] ENDOSCOPY;  Service:    • HAND TENDON SURGERY Right    • VASECTOMY  1987        MEDICATIONS:    Current Outpatient Medications:   •  amLODIPine (NORVASC) 5 MG tablet, , Disp: , Rfl:   •  aspirin 81 MG EC tablet, Take 81 mg by mouth Daily., Disp: , Rfl:   •  Cholecalciferol (VITAMIN D) 2000 units tablet, Take 2,000 Units by mouth Daily., Disp: , Rfl:   •  pravastatin (PRAVACHOL) 40 MG tablet, Take 40 mg by mouth Daily., Disp: , Rfl:   •  ramipril (ALTACE) 10 MG capsule, Take 10 mg by mouth Daily., Disp: , Rfl:   •  CREON 78272 units capsule delayed-release particles capsule, , Disp: , Rfl:   •  hydrochlorothiazide (HYDRODIURIL) 12.5 MG tablet, Take 12.5 mg by mouth Daily., Disp: , Rfl:     ALLERGIES  Allergies   Allergen Reactions   • Penicillins Hives       FAMILY HISTORY:  Family History   Problem Relation Age of Onset   • Alzheimer's disease Mother    • Heart disease Father        SOCIAL HISTORY  Social History     Socioeconomic History   • Marital status:      Spouse name: Caro   • Number of children: 2   • Years of education: College   • Highest education level: Not on file   Occupational History   • Occupation: [employer]     Employer: RETIRED   Tobacco Use   • Smoking status: Never Smoker   • Smokeless tobacco: Never Used   Substance and Sexual Activity   • Alcohol use: No     Frequency: Never   • Drug use: No   • Sexual activity: Yes     Partners: Female     Socioeconomic History:  He is retired.  He likes to golf.  He lives at home with his wife.  He has 2 children and 3  "grandchildren as well as another one \"on the way \".       REVIEW OF SYSTEMS  Review of Systems   Constitutional: Negative.    HENT: Negative.    Eyes: Negative.    Respiratory: Negative.    Cardiovascular: Negative.    Gastrointestinal: Negative.    Endocrine: Negative.    Genitourinary: Negative.    Musculoskeletal: Negative.    Skin: Negative.    Allergic/Immunologic: Negative.    Neurological: Negative.    Hematological: Negative.    Psychiatric/Behavioral: Negative.      I reviewed the above-noted review of systems.    PHYSICAL EXAM   There were no vitals taken for this visit.  General: Alert and oriented x 3. In no apparent or acute distress.  and No stigmata of chronic liver disease  HEENT: Anicteric slcera. Normal oropharynx  Neck: Supple. Without lymphadenopathy  CV: Regular rate and rhythm, S1, S2  Lungs: Clear to ausculation. Without rales, rhonchi and wheezing  Abdomen:  Soft,non-distended without palpable masses or hepatosplenomeagaly, areas of rebound tenderness or guarding.   Extremeties: without clubbing, cyanosis or edema  Neurologic:  Alert and oriented x 3 without focal motor or sensory deficits  Rectal exam: deferred     Results for orders placed or performed in visit on 01/16/19   Lipase   Result Value Ref Range    Lipase 59 (H) 6 - 51 U/L   Amylase   Result Value Ref Range    Amylase 102 30 - 118 U/L        Results Review:  I reviewed the patient's new clinical results.      ASSESSMENT  1.-  Patient with known pancreas divisum and recent bout of mild self-limited acute pancreatitis.  This is only his second bout and given the fact that he was entirely asymptomatic for 2 years and that the second bout was very mild I do not think that aggressive intervention is necessary such as repeat ERCP for minor papilla sphincterotomy.  I would however like to image his pancreas to make sure were not missing a pancreatic neoplasm or other complications such as pseudocyst development or IPMN.  We talked about " resuming pancreatic enzymes and given the fact that this is only his second bout in 2 years, we agreed to put that off for now with an eye towards resuming chronic pancreatic enzyme therapy for recurrent mild bouts of pancreatitis.    2.-  Patient is up-to-date on his colon cancer screening last colonoscopy, positive for adenomatous colonic polyps, was in 2015.    PLAN  1.-  Obtain recent labs  2.-  Pancreatic protocol CT  3.-  Follow-up appointment in 6 months  4.-  Consider pancreatic enzymes for mild recurrent bouts of pancreatitis versus ERCP for more significant progression of his pancreas divisum.      I discussed the patient's findings and my recommendations with patient    Judd Fernández MD  5/15/2020   08:05    Addendum  5/12/2020 amylase 138 units/L (high normal 110).  Lipase 201 units/L (high normal 78).  5/5/2020 amylase 142 units/L (high normal 110).  Lipase 165 units/L (high normal 78

## 2020-05-18 ENCOUNTER — TELEPHONE (OUTPATIENT)
Dept: GASTROENTEROLOGY | Facility: CLINIC | Age: 65
End: 2020-05-18

## 2020-05-18 NOTE — TELEPHONE ENCOUNTER
----- Message from Judd Fernández MD sent at 5/15/2020  8:38 AM EDT -----  I just noticed that he is possibly due for surveillance colonoscopy.  I did not do his colonoscopy in 2015 so he may already had that done but if we can get a copy of his 2015 colonoscopy from Dr. Marquez's office also to make sure that he is not due for surveillance.

## 2020-05-18 NOTE — TELEPHONE ENCOUNTER
----- Message from Judd Fernández MD sent at 5/15/2020  8:34 AM EDT -----  Please obtain May 5 and May 12 amylase and lipase from Dr. Saul office.  Thank you

## 2020-05-18 NOTE — TELEPHONE ENCOUNTER
Called Dr. Saul office and spoke with Kim who confirmed she will fax over the lab results for the May 5th and May 12th  Amylase and lipase to [phone]

## 2020-05-26 ENCOUNTER — TELEPHONE (OUTPATIENT)
Dept: GASTROENTEROLOGY | Facility: CLINIC | Age: 65
End: 2020-05-26

## 2020-05-26 DIAGNOSIS — Q45.3 PANCREAS DIVISUM: Primary | ICD-10-CM

## 2020-05-26 DIAGNOSIS — K85.90 ACUTE PANCREATITIS, UNSPECIFIED COMPLICATION STATUS, UNSPECIFIED PANCREATITIS TYPE: ICD-10-CM

## 2020-05-26 DIAGNOSIS — R74.8 ELEVATED LIPASE: ICD-10-CM

## 2020-05-26 NOTE — TELEPHONE ENCOUNTER
----- Message from Judd Fernández MD sent at 5/26/2020 10:40 AM EDT -----  Yes    ----- Message -----  From: Olga Kauffman MA  Sent: 5/26/2020   9:24 AM EDT  To: Judd Fernández MD    You recently ordered a Ct abdomen pancreas with contrast for a patient with pancreas divisum but central scheduling is saying this needs to be ordered as CT abdomen with and without contrast pancreas protocol.      Is it okay that we change the order to the recommended Ct abdomen with and without contrast pancreas protocol?

## 2020-05-27 ENCOUNTER — TELEPHONE (OUTPATIENT)
Dept: GASTROENTEROLOGY | Facility: CLINIC | Age: 65
End: 2020-05-27

## 2020-06-15 ENCOUNTER — HOSPITAL ENCOUNTER (OUTPATIENT)
Dept: CT IMAGING | Facility: HOSPITAL | Age: 65
Discharge: HOME | End: 2020-06-15
Admitting: INTERNAL MEDICINE

## 2020-06-15 DIAGNOSIS — Q45.3 PANCREAS DIVISUM: ICD-10-CM

## 2020-06-15 DIAGNOSIS — R74.8 ELEVATED LIPASE: ICD-10-CM

## 2020-06-15 DIAGNOSIS — K85.90 ACUTE PANCREATITIS, UNSPECIFIED COMPLICATION STATUS, UNSPECIFIED PANCREATITIS TYPE: ICD-10-CM

## 2020-06-15 LAB — CREAT BLDA-MCNC: 1.2 MG/DL (ref 0.6–1.3)

## 2020-06-15 PROCEDURE — 0 IOPAMIDOL PER 1 ML: Performed by: INTERNAL MEDICINE

## 2020-06-15 PROCEDURE — 82565 ASSAY OF CREATININE: CPT

## 2020-06-15 PROCEDURE — 74170 CT ABD WO CNTRST FLWD CNTRST: CPT

## 2020-06-15 RX ADMIN — IOPAMIDOL 95 ML: 755 INJECTION, SOLUTION INTRAVENOUS at 15:35

## 2020-11-17 ENCOUNTER — OFFICE VISIT (OUTPATIENT)
Dept: GASTROENTEROLOGY | Facility: CLINIC | Age: 65
End: 2020-11-17

## 2020-11-17 ENCOUNTER — LAB (OUTPATIENT)
Dept: LAB | Facility: HOSPITAL | Age: 65
End: 2020-11-17

## 2020-11-17 VITALS
TEMPERATURE: 98.4 F | WEIGHT: 162.2 LBS | BODY MASS INDEX: 25.4 KG/M2 | SYSTOLIC BLOOD PRESSURE: 133 MMHG | DIASTOLIC BLOOD PRESSURE: 68 MMHG | HEART RATE: 59 BPM

## 2020-11-17 DIAGNOSIS — K85.90 ACUTE PANCREATITIS, UNSPECIFIED COMPLICATION STATUS, UNSPECIFIED PANCREATITIS TYPE: Primary | ICD-10-CM

## 2020-11-17 DIAGNOSIS — Z86.010 HISTORY OF ADENOMATOUS POLYP OF COLON: ICD-10-CM

## 2020-11-17 DIAGNOSIS — Q45.3 PANCREAS DIVISUM: ICD-10-CM

## 2020-11-17 DIAGNOSIS — K85.90 ACUTE PANCREATITIS, UNSPECIFIED COMPLICATION STATUS, UNSPECIFIED PANCREATITIS TYPE: ICD-10-CM

## 2020-11-17 LAB
AMYLASE SERPL-CCNC: 98 U/L (ref 28–100)
LIPASE SERPL-CCNC: 59 U/L (ref 13–60)

## 2020-11-17 PROCEDURE — 83690 ASSAY OF LIPASE: CPT

## 2020-11-17 PROCEDURE — 99213 OFFICE O/P EST LOW 20 MIN: CPT | Performed by: INTERNAL MEDICINE

## 2020-11-17 PROCEDURE — 82150 ASSAY OF AMYLASE: CPT

## 2020-11-17 RX ORDER — CHLORTHALIDONE 25 MG/1
TABLET ORAL
COMMUNITY
Start: 2020-03-19

## 2020-11-17 RX ORDER — ACETAMINOPHEN 160 MG
TABLET,DISINTEGRATING ORAL
COMMUNITY

## 2020-11-17 RX ORDER — ASPIRIN 81 MG/1
1 TABLET ORAL DAILY
COMMUNITY

## 2020-11-17 NOTE — PROGRESS NOTES
GASTROENTEROLOGY OFFICE NOTE  Nimesh Muñoz  [id number]  1955    CARE TEAM  Patient Care Team:  Yuri Saul MD as PCP - General    No ref. provider found     Chief Complaint   Patient presents with   • Pancreas Divisum        HISTORY OF PRESENT ILLNESS:  Patient presents for follow-up of pancreas divisum with occasional episodes of acute pancreatitis.    Patient has had 2 distinct bouts of acute pancreatitis which have been self-limited and have not required intervention.  I attempted ERCP in 2018 resulted in an ERCP with failed minor papilla cannulation.  He has had CAT scan evidence of pancreatitis in 2018 during which time he had a 3-day hospitalization and a lipase which was elevated to 565 (high normal 349)    He is currently doing well and is asymptomatic.  He is not taking pancreatic enzymes and denies dysphagia to solids, odynophagia, early satiety or unexplained weight loss.  He has nothing to suggest ongoing problems with pancreatitis.    Overall he states that he is doing well today and is asymptomatic from a GI standpoint    PAST MEDICAL HISTORY  Past Medical History:   Diagnosis Date   • Hyperlipidemia    • Hypertension    • Left ACL tear    • Pancreas divisum    • Sleep apnea     wears sleep apnea bite guard    • Sleep apnea         PAST SURGICAL HISTORY  Past Surgical History:   Procedure Laterality Date   • COLONOSCOPY  2014   • ERCP N/A 2/23/2018    Procedure: ENDOSCOPIC RETROGRADE CHOLANGIOPANCREATOGRAPHY;  Surgeon: Judd Fernández MD;  Location: [facility] ENDOSCOPY;  Service:    • HAND TENDON SURGERY Right    • VASECTOMY  1987        MEDICATIONS:    Current Outpatient Medications:   •  amLODIPine (NORVASC) 5 MG tablet, , Disp: , Rfl:   •  aspirin 81 MG EC tablet, Take 81 mg by mouth Daily., Disp: , Rfl:   •  chlorthalidone (HYGROTON) 25 MG tablet, chlorthalidone 25 mg tablet  Take 1 tablet every day by oral route., Disp: , Rfl:   •  Cholecalciferol (VITAMIN D) 2000 units  tablet, Take 2,000 Units by mouth Daily., Disp: , Rfl:   •  Cholecalciferol (Vitamin D3) 50 MCG (2000 UT) capsule, Vitamin D3 50 mcg (2,000 unit) tablet  Take 1 tablet every day by oral route., Disp: , Rfl:   •  CREON 15938 units capsule delayed-release particles capsule, , Disp: , Rfl:   •  Docusate Calcium (STOOL SOFTENER PO), Stool Softener, Disp: , Rfl:   •  pravastatin (PRAVACHOL) 40 MG tablet, Take 40 mg by mouth Daily., Disp: , Rfl:   •  ramipril (ALTACE) 10 MG capsule, Take 10 mg by mouth Daily., Disp: , Rfl:   •  aspirin (aspirin) 81 MG EC tablet, Take 1 tablet by mouth Daily., Disp: , Rfl:   •  hydrochlorothiazide (HYDRODIURIL) 12.5 MG tablet, Take 12.5 mg by mouth Daily., Disp: , Rfl:     ALLERGIES  Allergies   Allergen Reactions   • Penicillins Hives       FAMILY HISTORY:  Family History   Problem Relation Age of Onset   • Alzheimer's disease Mother    • Heart disease Father    • Colon cancer Neg Hx    • Colon polyps Neg Hx        SOCIAL HISTORY  Social History     Socioeconomic History   • Marital status:      Spouse name: Caro   • Number of children: 2   • Years of education: College   • Highest education level: Not on file   Occupational History   • Occupation: [employer]     Employer: RETIRED   Tobacco Use   • Smoking status: Never Smoker   • Smokeless tobacco: Never Used   Substance and Sexual Activity   • Alcohol use: No     Frequency: Never   • Drug use: No   • Sexual activity: Yes     Partners: Female     Socioeconomic History:  Retired.  Golfs.  2 children and 4 grandchildren       REVIEW OF SYSTEMS  Review of Systems   Constitutional: Negative for activity change, appetite change, chills, diaphoresis, fatigue, fever, unexpected weight gain and unexpected weight loss.   HENT: Negative for trouble swallowing and voice change.    Gastrointestinal: Negative for abdominal distention, abdominal pain, anal bleeding, blood in stool, constipation, diarrhea, nausea, rectal pain, vomiting,  GERD and indigestion.     I reviewed the above-noted review of systems    PHYSICAL EXAM   /68 (BP Location: Left arm, Patient Position: Sitting, Cuff Size: Adult)   Pulse 59   Temp 98.4 °F (36.9 °C) (Temporal)   Wt 73.6 kg (162 lb 3.2 oz)   BMI 25.40 kg/m²   General: Alert and oriented x 3. In no apparent or acute distress.  and No stigmata of chronic liver disease  HEENT: Anicteric sclera.  Wearing facemask.  Neck: Supple. Without lymphadenopathy  CV: Regular rate and rhythm, S1, S2  Lungs: Clear to ausculation. Without rales, rhonchi and wheezing  Abdomen:  Soft,non-distended without palpable masses or hepatosplenomeagaly, areas of rebound tenderness or guarding.   Extremeties: without clubbing, cyanosis or edema  Neurologic:  Alert and oriented x 3 without focal motor or sensory deficits  Rectal exam: deferred         ASSESSMENT  1.-Pancreas divisum with 2 distinct episodes of pancreatitis which resolves rapidly with conservative management.  ERCP is not recommended at this time  2.-History of adenomatous colonic polyps.  Last colonoscopy 2016 by Dr. Octaviano Brock    PLAN  1.-Conservative management.  Standing order for amylase lipase level for him to check when he believes he is having pancreatitis we will go ahead and get a baseline amylase lipase today when he is asymptomatic as last one we had was mildly elevated.  2.-Surveillance colonoscopy is due next year March 2021      Judd Fernández MD  11/17/2020   14:20 EST

## 2020-11-18 NOTE — PROGRESS NOTES
Lipase improved from last year (number looks same but reference range is different). Normal Amylase. No new recommendations

## 2020-11-20 ENCOUNTER — TELEPHONE (OUTPATIENT)
Dept: GASTROENTEROLOGY | Facility: CLINIC | Age: 65
End: 2020-11-20

## 2020-11-20 PROBLEM — Z86.010 HISTORY OF ADENOMATOUS POLYP OF COLON: Status: ACTIVE | Noted: 2020-11-20

## 2020-11-20 NOTE — TELEPHONE ENCOUNTER
----- Message from Judd Fernández MD sent at 11/20/2020  6:37 AM EST -----  Needs to go into the recall system for March 2021 surveillance colonoscopy history of colonic polyps.

## 2021-03-31 ENCOUNTER — IMMUNIZATION (OUTPATIENT)
Dept: VACCINE CLINIC | Facility: HOSPITAL | Age: 66
End: 2021-03-31

## 2021-03-31 PROCEDURE — 91300 HC SARSCOV02 VAC 30MCG/0.3ML IM: CPT | Performed by: INTERNAL MEDICINE

## 2021-03-31 PROCEDURE — 0001A: CPT | Performed by: INTERNAL MEDICINE

## 2021-04-26 ENCOUNTER — IMMUNIZATION (OUTPATIENT)
Dept: VACCINE CLINIC | Facility: HOSPITAL | Age: 66
End: 2021-04-26

## 2021-04-26 PROCEDURE — 91300 HC SARSCOV02 VAC 30MCG/0.3ML IM: CPT | Performed by: INTERNAL MEDICINE

## 2021-04-26 PROCEDURE — 0002A: CPT | Performed by: INTERNAL MEDICINE

## (undated) DEVICE — SYR LUERLOK 20CC

## (undated) DEVICE — Device

## (undated) DEVICE — ERCP CANNULA - 5-4-3 TIP: Brand: CONTOUR ERCP CANNULA

## (undated) DEVICE — BOWL UTIL STRL 32OZ

## (undated) DEVICE — GW JAG STR .035IN 450CM

## (undated) DEVICE — SYR LUERLOK 50ML

## (undated) DEVICE — ADAPT ST INFUS ADMIN SYR 70IN

## (undated) DEVICE — GW TRACER METRO DIR STR .021IN 480CM BLK

## (undated) DEVICE — CANN NASL CO2 DIVIDED A/

## (undated) DEVICE — TRIPLE LUMEN ERCP CANNULA: Brand: TANDEM XL

## (undated) DEVICE — GW JAG STR .025IN 450CM